# Patient Record
Sex: FEMALE | Race: WHITE | NOT HISPANIC OR LATINO | URBAN - METROPOLITAN AREA
[De-identification: names, ages, dates, MRNs, and addresses within clinical notes are randomized per-mention and may not be internally consistent; named-entity substitution may affect disease eponyms.]

---

## 2018-01-17 ENCOUNTER — OUTPATIENT (OUTPATIENT)
Dept: OUTPATIENT SERVICES | Facility: HOSPITAL | Age: 63
LOS: 1 days | End: 2018-01-17
Payer: COMMERCIAL

## 2018-01-17 DIAGNOSIS — K21.9 GASTRO-ESOPHAGEAL REFLUX DISEASE WITHOUT ESOPHAGITIS: ICD-10-CM

## 2018-01-17 DIAGNOSIS — R19.4 CHANGE IN BOWEL HABIT: ICD-10-CM

## 2018-01-17 DIAGNOSIS — Z01.818 ENCOUNTER FOR OTHER PREPROCEDURAL EXAMINATION: ICD-10-CM

## 2018-01-17 PROCEDURE — 93010 ELECTROCARDIOGRAM REPORT: CPT

## 2018-01-17 PROCEDURE — 93005 ELECTROCARDIOGRAM TRACING: CPT

## 2018-01-17 PROCEDURE — G0463: CPT

## 2018-01-18 ENCOUNTER — OUTPATIENT (OUTPATIENT)
Dept: OUTPATIENT SERVICES | Facility: HOSPITAL | Age: 63
LOS: 1 days | End: 2018-01-18
Payer: COMMERCIAL

## 2018-01-18 ENCOUNTER — RESULT REVIEW (OUTPATIENT)
Age: 63
End: 2018-01-18

## 2018-01-18 DIAGNOSIS — R19.4 CHANGE IN BOWEL HABIT: ICD-10-CM

## 2018-01-18 DIAGNOSIS — K21.9 GASTRO-ESOPHAGEAL REFLUX DISEASE WITHOUT ESOPHAGITIS: ICD-10-CM

## 2018-01-18 PROCEDURE — 88305 TISSUE EXAM BY PATHOLOGIST: CPT

## 2018-01-18 PROCEDURE — 88305 TISSUE EXAM BY PATHOLOGIST: CPT | Mod: 26

## 2018-01-18 PROCEDURE — 88312 SPECIAL STAINS GROUP 1: CPT | Mod: 26

## 2018-01-18 PROCEDURE — 88312 SPECIAL STAINS GROUP 1: CPT

## 2018-01-18 PROCEDURE — 43239 EGD BIOPSY SINGLE/MULTIPLE: CPT

## 2018-01-19 LAB — SURGICAL PATHOLOGY STUDY: SIGNIFICANT CHANGE UP

## 2018-02-09 PROBLEM — I34.0 NONRHEUMATIC MITRAL (VALVE) INSUFFICIENCY: Chronic | Status: ACTIVE | Noted: 2018-01-17

## 2018-02-09 PROBLEM — I27.20 PULMONARY HYPERTENSION, UNSPECIFIED: Chronic | Status: ACTIVE | Noted: 2018-01-17

## 2018-02-09 PROBLEM — K21.9 GASTRO-ESOPHAGEAL REFLUX DISEASE WITHOUT ESOPHAGITIS: Chronic | Status: ACTIVE | Noted: 2018-01-17

## 2018-02-09 PROBLEM — R01.1 CARDIAC MURMUR, UNSPECIFIED: Chronic | Status: ACTIVE | Noted: 2018-01-17

## 2018-02-16 ENCOUNTER — OUTPATIENT (OUTPATIENT)
Dept: OUTPATIENT SERVICES | Facility: HOSPITAL | Age: 63
LOS: 1 days | End: 2018-02-16
Payer: COMMERCIAL

## 2018-02-16 ENCOUNTER — RESULT REVIEW (OUTPATIENT)
Age: 63
End: 2018-02-16

## 2018-02-16 DIAGNOSIS — K21.9 GASTRO-ESOPHAGEAL REFLUX DISEASE WITHOUT ESOPHAGITIS: ICD-10-CM

## 2018-02-16 PROCEDURE — 88305 TISSUE EXAM BY PATHOLOGIST: CPT | Mod: 26

## 2018-02-16 PROCEDURE — 45385 COLONOSCOPY W/LESION REMOVAL: CPT

## 2018-02-16 PROCEDURE — 88305 TISSUE EXAM BY PATHOLOGIST: CPT

## 2018-02-20 LAB — SURGICAL PATHOLOGY STUDY: SIGNIFICANT CHANGE UP

## 2018-08-08 ENCOUNTER — INPATIENT (INPATIENT)
Facility: HOSPITAL | Age: 63
LOS: 0 days | Discharge: HOME | DRG: 394 | End: 2018-08-09
Attending: INTERNAL MEDICINE | Admitting: INTERNAL MEDICINE
Payer: COMMERCIAL

## 2018-08-08 VITALS
HEART RATE: 74 BPM | OXYGEN SATURATION: 97 % | SYSTOLIC BLOOD PRESSURE: 151 MMHG | TEMPERATURE: 98 F | RESPIRATION RATE: 18 BRPM | DIASTOLIC BLOOD PRESSURE: 80 MMHG

## 2018-08-08 DIAGNOSIS — T18.128A FOOD IN ESOPHAGUS CAUSING OTHER INJURY, INITIAL ENCOUNTER: ICD-10-CM

## 2018-08-08 LAB
ALBUMIN SERPL ELPH-MCNC: 4.8 G/DL — SIGNIFICANT CHANGE UP (ref 3.3–5)
ALP SERPL-CCNC: 59 U/L — SIGNIFICANT CHANGE UP (ref 40–120)
ALT FLD-CCNC: 14 U/L — SIGNIFICANT CHANGE UP (ref 10–45)
ANION GAP SERPL CALC-SCNC: 14 MMOL/L — SIGNIFICANT CHANGE UP (ref 5–17)
APTT BLD: 29.5 SEC — SIGNIFICANT CHANGE UP (ref 27.5–37.4)
AST SERPL-CCNC: 24 U/L — SIGNIFICANT CHANGE UP (ref 10–40)
BASOPHILS # BLD AUTO: 0 K/UL — SIGNIFICANT CHANGE UP (ref 0–0.2)
BASOPHILS NFR BLD AUTO: 0.8 % — SIGNIFICANT CHANGE UP (ref 0–2)
BILIRUB SERPL-MCNC: 0.3 MG/DL — SIGNIFICANT CHANGE UP (ref 0.2–1.2)
BLD GP AB SCN SERPL QL: NEGATIVE — SIGNIFICANT CHANGE UP
BUN SERPL-MCNC: 14 MG/DL — SIGNIFICANT CHANGE UP (ref 7–23)
CALCIUM SERPL-MCNC: 9.9 MG/DL — SIGNIFICANT CHANGE UP (ref 8.4–10.5)
CHLORIDE SERPL-SCNC: 101 MMOL/L — SIGNIFICANT CHANGE UP (ref 96–108)
CO2 SERPL-SCNC: 25 MMOL/L — SIGNIFICANT CHANGE UP (ref 22–31)
CREAT SERPL-MCNC: 0.83 MG/DL — SIGNIFICANT CHANGE UP (ref 0.5–1.3)
EOSINOPHIL # BLD AUTO: 0.2 K/UL — SIGNIFICANT CHANGE UP (ref 0–0.5)
EOSINOPHIL NFR BLD AUTO: 2.9 % — SIGNIFICANT CHANGE UP (ref 0–6)
GLUCOSE SERPL-MCNC: 86 MG/DL — SIGNIFICANT CHANGE UP (ref 70–99)
HCT VFR BLD CALC: 38.4 % — SIGNIFICANT CHANGE UP (ref 34.5–45)
HGB BLD-MCNC: 12.1 G/DL — SIGNIFICANT CHANGE UP (ref 11.5–15.5)
INR BLD: 1.17 RATIO — HIGH (ref 0.88–1.16)
LYMPHOCYTES # BLD AUTO: 1.5 K/UL — SIGNIFICANT CHANGE UP (ref 1–3.3)
LYMPHOCYTES # BLD AUTO: 27.8 % — SIGNIFICANT CHANGE UP (ref 13–44)
MCHC RBC-ENTMCNC: 26 PG — LOW (ref 27–34)
MCHC RBC-ENTMCNC: 31.5 GM/DL — LOW (ref 32–36)
MCV RBC AUTO: 82.4 FL — SIGNIFICANT CHANGE UP (ref 80–100)
MONOCYTES # BLD AUTO: 0.5 K/UL — SIGNIFICANT CHANGE UP (ref 0–0.9)
MONOCYTES NFR BLD AUTO: 8.9 % — SIGNIFICANT CHANGE UP (ref 2–14)
NEUTROPHILS # BLD AUTO: 3.2 K/UL — SIGNIFICANT CHANGE UP (ref 1.8–7.4)
NEUTROPHILS NFR BLD AUTO: 59.6 % — SIGNIFICANT CHANGE UP (ref 43–77)
PLATELET # BLD AUTO: 179 K/UL — SIGNIFICANT CHANGE UP (ref 150–400)
POTASSIUM SERPL-MCNC: 4.2 MMOL/L — SIGNIFICANT CHANGE UP (ref 3.5–5.3)
POTASSIUM SERPL-SCNC: 4.2 MMOL/L — SIGNIFICANT CHANGE UP (ref 3.5–5.3)
PROT SERPL-MCNC: 8.7 G/DL — HIGH (ref 6–8.3)
PROTHROM AB SERPL-ACNC: 12.8 SEC — HIGH (ref 9.8–12.7)
RBC # BLD: 4.67 M/UL — SIGNIFICANT CHANGE UP (ref 3.8–5.2)
RBC # FLD: 13.8 % — SIGNIFICANT CHANGE UP (ref 10.3–14.5)
RH IG SCN BLD-IMP: POSITIVE — SIGNIFICANT CHANGE UP
SODIUM SERPL-SCNC: 140 MMOL/L — SIGNIFICANT CHANGE UP (ref 135–145)
WBC # BLD: 5.4 K/UL — SIGNIFICANT CHANGE UP (ref 3.8–10.5)
WBC # FLD AUTO: 5.4 K/UL — SIGNIFICANT CHANGE UP (ref 3.8–10.5)

## 2018-08-08 PROCEDURE — 99223 1ST HOSP IP/OBS HIGH 75: CPT

## 2018-08-08 PROCEDURE — 71046 X-RAY EXAM CHEST 2 VIEWS: CPT | Mod: 26

## 2018-08-08 PROCEDURE — 93010 ELECTROCARDIOGRAM REPORT: CPT | Mod: NC

## 2018-08-08 PROCEDURE — 99285 EMERGENCY DEPT VISIT HI MDM: CPT | Mod: 25

## 2018-08-08 RX ORDER — SODIUM CHLORIDE 9 MG/ML
1000 INJECTION INTRAMUSCULAR; INTRAVENOUS; SUBCUTANEOUS ONCE
Qty: 0 | Refills: 0 | Status: COMPLETED | OUTPATIENT
Start: 2018-08-08 | End: 2018-08-08

## 2018-08-08 RX ORDER — MYCOPHENOLATE MOFETIL 250 MG/1
2 CAPSULE ORAL
Qty: 0 | Refills: 0 | COMMUNITY

## 2018-08-08 RX ORDER — MYCOPHENOLATE MOFETIL 250 MG/1
3 CAPSULE ORAL
Qty: 0 | Refills: 0 | COMMUNITY

## 2018-08-08 RX ORDER — HYDROMORPHONE HYDROCHLORIDE 2 MG/ML
0.5 INJECTION INTRAMUSCULAR; INTRAVENOUS; SUBCUTANEOUS
Qty: 0 | Refills: 0 | Status: DISCONTINUED | OUTPATIENT
Start: 2018-08-08 | End: 2018-08-09

## 2018-08-08 RX ORDER — SODIUM CHLORIDE 9 MG/ML
1000 INJECTION, SOLUTION INTRAVENOUS
Qty: 0 | Refills: 0 | Status: DISCONTINUED | OUTPATIENT
Start: 2018-08-08 | End: 2018-08-09

## 2018-08-08 RX ORDER — HEPARIN SODIUM 5000 [USP'U]/ML
5000 INJECTION INTRAVENOUS; SUBCUTANEOUS EVERY 12 HOURS
Qty: 0 | Refills: 0 | Status: DISCONTINUED | OUTPATIENT
Start: 2018-08-08 | End: 2018-08-09

## 2018-08-08 RX ORDER — ONDANSETRON 8 MG/1
4 TABLET, FILM COATED ORAL ONCE
Qty: 0 | Refills: 0 | Status: DISCONTINUED | OUTPATIENT
Start: 2018-08-08 | End: 2018-08-09

## 2018-08-08 RX ORDER — PANTOPRAZOLE SODIUM 20 MG/1
40 TABLET, DELAYED RELEASE ORAL EVERY 12 HOURS
Qty: 0 | Refills: 0 | Status: DISCONTINUED | OUTPATIENT
Start: 2018-08-08 | End: 2018-08-09

## 2018-08-08 RX ORDER — LEVETIRACETAM 250 MG/1
500 TABLET, FILM COATED ORAL EVERY 12 HOURS
Qty: 0 | Refills: 0 | Status: DISCONTINUED | OUTPATIENT
Start: 2018-08-08 | End: 2018-08-09

## 2018-08-08 RX ORDER — PANTOPRAZOLE SODIUM 20 MG/1
40 TABLET, DELAYED RELEASE ORAL EVERY 12 HOURS
Qty: 0 | Refills: 0 | Status: DISCONTINUED | OUTPATIENT
Start: 2018-08-08 | End: 2018-08-08

## 2018-08-08 RX ADMIN — SODIUM CHLORIDE 1000 MILLILITER(S): 9 INJECTION INTRAMUSCULAR; INTRAVENOUS; SUBCUTANEOUS at 19:50

## 2018-08-08 NOTE — H&P ADULT - NSHPPHYSICALEXAM_GEN_ALL_CORE
Vital Signs Last 24 Hrs  T(C): 36.2 (08 Aug 2018 22:35), Max: 36.4 (08 Aug 2018 18:46)  T(F): 97.2 (08 Aug 2018 22:35), Max: 97.6 (08 Aug 2018 18:46)  HR: 79 (08 Aug 2018 23:30) (71 - 79)  BP: 166/72 (08 Aug 2018 23:30) (145/66 - 172/79)  BP(mean): 108 (08 Aug 2018 23:30) (95 - 108)  RR: 16 (08 Aug 2018 23:30) (16 - 18)  SpO2: 99% (08 Aug 2018 23:30) (97% - 100%)    GENERAL: No acute distress, well-developed  HEAD:  Atraumatic, Normocephalic  EYES: EOMI, PERRLA, conjunctiva and sclera clear  ENT: Oral mucosa moist  NECK: Neck supple  CHEST/LUNG: minimal coarse crackles diffusely, No wheeze, no rales, no rhonchi.    HEART: Regular rate and rhythm; No murmurs, rubs, or gallops  ABDOMEN: Soft, Nontender, Nondistended; Bowel sounds present  EXTREMITIES:  No clubbing, cyanosis, or edema  VASCULAR: Posterior tibialis pulses intact bilaterally  PSYCH: Normal behavior, normal affect  NEUROLOGY: AAOx3  SKIN: grossly warm and dry

## 2018-08-08 NOTE — H&P ADULT - PROBLEM SELECTOR PLAN 4
Pt noted with atherosclerosis on prior CT from 3/12/2018 based on report  Is on baby ASA but not on any statins, did have TONIA showing good LV systolic function w/EF 65%.  Pt reports good f/u with cardiologist who is aware of report? Patient might benefit from statin therapy vs other further cardiac workup.  d/w patient to have close cardiologist f/u after discharge from hospital  Once able to resume po should be placed back on metoprolol, baby asa.

## 2018-08-08 NOTE — ED PROVIDER NOTE - ATTENDING CONTRIBUTION TO CARE
61 yo female with solid food bolus impaction x ~24h, unable to tolerate PO although handling secretions.  D/W GI -- plan to take patient for endoscopy tonight as an add-on case.  Will send pre-op labs, admit to medicine.

## 2018-08-08 NOTE — H&P ADULT - PROBLEM SELECTOR PLAN 2
Pt w/seizure d/o but has not been able to tolerate food well due to impaction. Pt unsure when she was last able to successfully take lamictal though likely was yesterday AM.   Check lamictal level  Start on IV keppra for now until able to resume lamictal

## 2018-08-08 NOTE — ED ADULT TRIAGE NOTE - CHIEF COMPLAINT QUOTE
sent here for endoscopy; reports ate part of a hot dog, "might be stuck", called MD told to come to the ED for endoscopy; no difficulty breathing

## 2018-08-08 NOTE — ED PROVIDER NOTE - PMH
Arrhythmia    Arthritis    Epilepsy    GERD (gastroesophageal reflux disease)    Heart murmur    Mitral regurgitation  mild  Pulmonary hypertension    Scleroderma

## 2018-08-08 NOTE — ED PROVIDER NOTE - INTERPRETATION
EKG reviewed for rate, rhythm, axis, intervals and segments, including QRS morphology, P wave appearance T wave appearance, CA interval, and QT interval.  I find the EKG to be unremarkable in all of these regards except as follows: RBBB, LAFB, LVH,

## 2018-08-08 NOTE — ED PROVIDER NOTE - OBJECTIVE STATEMENT
61 y/o female hx of scleroderms who presents to the ED for possible food impaction. reports unable to eat or drink anything in the last 24 hours without regurgitating it. reports she feels like something is stuck in her central chest. otherwise feeling well.

## 2018-08-08 NOTE — H&P ADULT - PROBLEM SELECTOR PLAN 1
s/p extraction, however with significant ulceration/maceration at site of impaction and food was not able to be passed distally during endoscopy.  Will need esophagram prior to po intake  NPO at this time  F/U Esophagram ordered by Dr Lucio  If no issues on esophagram plan for advancing to clears and resuming po meds

## 2018-08-08 NOTE — H&P ADULT - ASSESSMENT
61 y/o F w/pmhx of seizure/epilepsy, scleroderma, pulmonary HTN on bosentan, arrhythmia (pt unsure what type), GERD, thyroid nodules, hx food impaction in the past now presenting with inability to eat solid foods and vomiting up of eaten food x 1 day now s/p extraction of impacted food bolus (hot dog)

## 2018-08-08 NOTE — H&P ADULT - HISTORY OF PRESENT ILLNESS
61 y/o F w/pmhx of seizure/epilepsy, scleroderma, pulmonary HTN on bosentan, arrhythmia (pt unsure what type), GERD, thyroid nodules, hx food impaction in the past now presenting with inability to eat solid foods and vomiting up of eaten food x 1 day. Patient reports that yesterday evening she was eating a hot dog for dinner when she felt as if part of the hot dog had gotten stuck in her chest. Patient had not thought too much of it and had gone to bed. Today she had attempted to eat lunch and was initially able to get down small amounts of food and small amounts of liquid. However, later in the afternoon and evening she began to have vomiting up of liquids, and further attempts to eat dinner were not successful with vomiting up of undigested food. Because of this her family was concerned and patient called her gastroenterologist Dr. Lucio and was told to go to the ED for treatment. Patient denies any fevers, chest pain, shortness of breath, diarrhea, or constipation. Does report that a few years ago she had weight loss of 15 pounds and frequent diarrhea but that the diarrhea had resolved though she has not been able to regain significant weight, patient does report multiple checkups of her lungs and heart and thyroid including CT chest in march as well as TTE in march.

## 2018-08-08 NOTE — PROGRESS NOTE ADULT - SUBJECTIVE AND OBJECTIVE BOX
GI Procedure Note (see admission H&P in physical chart):    After explanation of indication, procedure, risks (including bleeding and perforation), benefits, and alternatives, the patient gave informed consent for an EGD in the OR to assess for a food impaction.    The procedure was performed with the patient intubated under general anesthesia for airway protection.    Findings = see report for details    Imp: S/P removal of esophageal food impaction    Plan: PACU then admit to medical [facility]          NPO overnight          Esophagram in AM--if perforation ruled out, then start clear liquid diet and oral medications          Pantoprazole 40 mg IV q 12h      Discussed with hospitalist, Dr. Perkins; patient, and her sister-in-law

## 2018-08-08 NOTE — H&P ADULT - NSHPLABSRESULTS_GEN_ALL_CORE
Labs personally reviewed:                        12.1   5.4   )-----------( 179      ( 08 Aug 2018 20:12 )             38.4       08-08    140  |  101  |  14  ----------------------------<  86  4.2   |  25  |  0.83    Ca    9.9      08 Aug 2018 20:12    TPro  8.7<H>  /  Alb  4.8  /  TBili  0.3  /  DBili  x   /  AST  24  /  ALT  14  /  AlkPhos  59  08-08            LIVER FUNCTIONS - ( 08 Aug 2018 20:12 )  Alb: 4.8 g/dL / Pro: 8.7 g/dL / ALK PHOS: 59 U/L / ALT: 14 U/L / AST: 24 U/L / GGT: x             PT/INR - ( 08 Aug 2018 20:12 )   PT: 12.8 sec;   INR: 1.17 ratio         PTT - ( 08 Aug 2018 20:12 )  PTT:29.5 sec    CXR personally reviewed-clear lungs    CT from 3/12/2018 report reviewed from [facility]-aveolitis w/minimal fibrosis c/w scleroderma. Signs of coronary atherosclerosis.  TTE from 3/12/2018 report obtained and reviewed-EF 65%, mild AR, minimal AS/TR/AL.    Endoscopy report from today reviewed: Hot dog impacted in esophagus now removed. Case discussed with gastroenterologist Dr Lucio at bedside.

## 2018-08-08 NOTE — ED ADULT NURSE NOTE - NSIMPLEMENTINTERV_GEN_ALL_ED
Implemented All Universal Safety Interventions:  [place] to call system. Call bell, personal items and telephone within reach. Instruct patient to call for assistance. Room bathroom lighting operational. Non-slip footwear when patient is off stretcher. Physically safe environment: no spills, clutter or unnecessary equipment. Stretcher in lowest position, wheels locked, appropriate side rails in place.

## 2018-08-08 NOTE — ED ADULT NURSE NOTE - OBJECTIVE STATEMENT
61 y/o female hx of scleroderma who presents to the ED for possible food impaction. reports unable to eat or drink anything in the last 24 hours without regurgitating it. reports she feels like something is stuck in her central chest. otherwise feeling well.

## 2018-08-09 VITALS
SYSTOLIC BLOOD PRESSURE: 138 MMHG | RESPIRATION RATE: 14 BRPM | OXYGEN SATURATION: 100 % | HEART RATE: 68 BPM | DIASTOLIC BLOOD PRESSURE: 72 MMHG | TEMPERATURE: 98 F

## 2018-08-09 DIAGNOSIS — T18.128A FOOD IN ESOPHAGUS CAUSING OTHER INJURY, INITIAL ENCOUNTER: ICD-10-CM

## 2018-08-09 DIAGNOSIS — I27.20 PULMONARY HYPERTENSION, UNSPECIFIED: ICD-10-CM

## 2018-08-09 DIAGNOSIS — M34.9 SYSTEMIC SCLEROSIS, UNSPECIFIED: ICD-10-CM

## 2018-08-09 DIAGNOSIS — G40.909 EPILEPSY, UNSPECIFIED, NOT INTRACTABLE, WITHOUT STATUS EPILEPTICUS: ICD-10-CM

## 2018-08-09 DIAGNOSIS — I25.10 ATHEROSCLEROTIC HEART DISEASE OF NATIVE CORONARY ARTERY WITHOUT ANGINA PECTORIS: ICD-10-CM

## 2018-08-09 DIAGNOSIS — K21.9 GASTRO-ESOPHAGEAL REFLUX DISEASE WITHOUT ESOPHAGITIS: ICD-10-CM

## 2018-08-09 LAB
ANION GAP SERPL CALC-SCNC: 13 MMOL/L — SIGNIFICANT CHANGE UP (ref 5–17)
BUN SERPL-MCNC: 11 MG/DL — SIGNIFICANT CHANGE UP (ref 7–23)
CALCIUM SERPL-MCNC: 9.1 MG/DL — SIGNIFICANT CHANGE UP (ref 8.4–10.5)
CHLORIDE SERPL-SCNC: 97 MMOL/L — SIGNIFICANT CHANGE UP (ref 96–108)
CO2 SERPL-SCNC: 23 MMOL/L — SIGNIFICANT CHANGE UP (ref 22–31)
CREAT SERPL-MCNC: 0.72 MG/DL — SIGNIFICANT CHANGE UP (ref 0.5–1.3)
GLUCOSE SERPL-MCNC: 147 MG/DL — HIGH (ref 70–99)
HCT VFR BLD CALC: 31.4 % — LOW (ref 34.5–45)
HGB BLD-MCNC: 11.1 G/DL — LOW (ref 11.5–15.5)
MCHC RBC-ENTMCNC: 29 PG — SIGNIFICANT CHANGE UP (ref 27–34)
MCHC RBC-ENTMCNC: 35.4 GM/DL — SIGNIFICANT CHANGE UP (ref 32–36)
MCV RBC AUTO: 82 FL — SIGNIFICANT CHANGE UP (ref 80–100)
PLATELET # BLD AUTO: 145 K/UL — LOW (ref 150–400)
POTASSIUM SERPL-MCNC: 3.9 MMOL/L — SIGNIFICANT CHANGE UP (ref 3.5–5.3)
POTASSIUM SERPL-SCNC: 3.9 MMOL/L — SIGNIFICANT CHANGE UP (ref 3.5–5.3)
RBC # BLD: 3.82 M/UL — SIGNIFICANT CHANGE UP (ref 3.8–5.2)
RBC # FLD: 13.5 % — SIGNIFICANT CHANGE UP (ref 10.3–14.5)
SODIUM SERPL-SCNC: 133 MMOL/L — LOW (ref 135–145)
T4 FREE SERPL-MCNC: 1.4 NG/DL — SIGNIFICANT CHANGE UP (ref 0.9–1.8)
TSH SERPL-MCNC: 0.46 UIU/ML — SIGNIFICANT CHANGE UP (ref 0.27–4.2)
WBC # BLD: 6 K/UL — SIGNIFICANT CHANGE UP (ref 3.8–10.5)
WBC # FLD AUTO: 6 K/UL — SIGNIFICANT CHANGE UP (ref 3.8–10.5)

## 2018-08-09 PROCEDURE — 93005 ELECTROCARDIOGRAM TRACING: CPT

## 2018-08-09 PROCEDURE — 80048 BASIC METABOLIC PNL TOTAL CA: CPT

## 2018-08-09 PROCEDURE — 99285 EMERGENCY DEPT VISIT HI MDM: CPT

## 2018-08-09 PROCEDURE — 85610 PROTHROMBIN TIME: CPT

## 2018-08-09 PROCEDURE — 74220 X-RAY XM ESOPHAGUS 1CNTRST: CPT

## 2018-08-09 PROCEDURE — 86900 BLOOD TYPING SEROLOGIC ABO: CPT

## 2018-08-09 PROCEDURE — 84443 ASSAY THYROID STIM HORMONE: CPT

## 2018-08-09 PROCEDURE — 85027 COMPLETE CBC AUTOMATED: CPT

## 2018-08-09 PROCEDURE — 71046 X-RAY EXAM CHEST 2 VIEWS: CPT

## 2018-08-09 PROCEDURE — 86850 RBC ANTIBODY SCREEN: CPT

## 2018-08-09 PROCEDURE — 86901 BLOOD TYPING SEROLOGIC RH(D): CPT

## 2018-08-09 PROCEDURE — 74220 X-RAY XM ESOPHAGUS 1CNTRST: CPT | Mod: 26

## 2018-08-09 PROCEDURE — 84439 ASSAY OF FREE THYROXINE: CPT

## 2018-08-09 PROCEDURE — 85730 THROMBOPLASTIN TIME PARTIAL: CPT

## 2018-08-09 PROCEDURE — 80053 COMPREHEN METABOLIC PANEL: CPT

## 2018-08-09 RX ORDER — MYCOPHENOLATE MOFETIL 250 MG/1
1000 CAPSULE ORAL AT BEDTIME
Qty: 0 | Refills: 0 | Status: DISCONTINUED | OUTPATIENT
Start: 2018-08-09 | End: 2018-08-09

## 2018-08-09 RX ORDER — CHOLECALCIFEROL (VITAMIN D3) 125 MCG
400 CAPSULE ORAL DAILY
Qty: 0 | Refills: 0 | Status: DISCONTINUED | OUTPATIENT
Start: 2018-08-09 | End: 2018-08-09

## 2018-08-09 RX ORDER — ASPIRIN/CALCIUM CARB/MAGNESIUM 324 MG
325 TABLET ORAL DAILY
Qty: 0 | Refills: 0 | Status: DISCONTINUED | OUTPATIENT
Start: 2018-08-09 | End: 2018-08-09

## 2018-08-09 RX ORDER — DOXAZOSIN MESYLATE 4 MG
2 TABLET ORAL AT BEDTIME
Qty: 0 | Refills: 0 | Status: DISCONTINUED | OUTPATIENT
Start: 2018-08-09 | End: 2018-08-09

## 2018-08-09 RX ORDER — MYCOPHENOLATE MOFETIL 250 MG/1
1500 CAPSULE ORAL DAILY
Qty: 0 | Refills: 0 | Status: DISCONTINUED | OUTPATIENT
Start: 2018-08-10 | End: 2018-08-09

## 2018-08-09 RX ORDER — ACETAMINOPHEN 500 MG
650 TABLET ORAL EVERY 6 HOURS
Qty: 0 | Refills: 0 | Status: DISCONTINUED | OUTPATIENT
Start: 2018-08-09 | End: 2018-08-09

## 2018-08-09 RX ORDER — FERROUS SULFATE 325(65) MG
325 TABLET ORAL
Qty: 0 | Refills: 0 | Status: DISCONTINUED | OUTPATIENT
Start: 2018-08-09 | End: 2018-08-09

## 2018-08-09 RX ORDER — METOPROLOL TARTRATE 50 MG
1 TABLET ORAL
Qty: 0 | Refills: 0 | COMMUNITY

## 2018-08-09 RX ORDER — MYCOPHENOLATE MOFETIL 250 MG/1
1500 CAPSULE ORAL ONCE
Qty: 0 | Refills: 0 | Status: COMPLETED | OUTPATIENT
Start: 2018-08-09 | End: 2018-08-09

## 2018-08-09 RX ORDER — LAMOTRIGINE 25 MG/1
200 TABLET, ORALLY DISINTEGRATING ORAL DAILY
Qty: 0 | Refills: 0 | Status: DISCONTINUED | OUTPATIENT
Start: 2018-08-09 | End: 2018-08-09

## 2018-08-09 RX ORDER — BOSENTAN 125 MG/1
125 TABLET, FILM COATED ORAL
Qty: 0 | Refills: 0 | Status: DISCONTINUED | OUTPATIENT
Start: 2018-08-09 | End: 2018-08-09

## 2018-08-09 RX ORDER — NIFEDIPINE 30 MG
60 TABLET, EXTENDED RELEASE 24 HR ORAL DAILY
Qty: 0 | Refills: 0 | Status: DISCONTINUED | OUTPATIENT
Start: 2018-08-09 | End: 2018-08-09

## 2018-08-09 RX ORDER — METOPROLOL TARTRATE 50 MG
50 TABLET ORAL
Qty: 0 | Refills: 0 | Status: DISCONTINUED | OUTPATIENT
Start: 2018-08-09 | End: 2018-08-09

## 2018-08-09 RX ADMIN — Medication 60 MILLIGRAM(S): at 15:32

## 2018-08-09 RX ADMIN — LEVETIRACETAM 400 MILLIGRAM(S): 250 TABLET, FILM COATED ORAL at 01:00

## 2018-08-09 RX ADMIN — MYCOPHENOLATE MOFETIL 1500 MILLIGRAM(S): 250 CAPSULE ORAL at 15:31

## 2018-08-09 RX ADMIN — LEVETIRACETAM 400 MILLIGRAM(S): 250 TABLET, FILM COATED ORAL at 12:23

## 2018-08-09 RX ADMIN — PANTOPRAZOLE SODIUM 40 MILLIGRAM(S): 20 TABLET, DELAYED RELEASE ORAL at 00:45

## 2018-08-09 RX ADMIN — SODIUM CHLORIDE 80 MILLILITER(S): 9 INJECTION, SOLUTION INTRAVENOUS at 00:45

## 2018-08-09 RX ADMIN — PANTOPRAZOLE SODIUM 40 MILLIGRAM(S): 20 TABLET, DELAYED RELEASE ORAL at 12:23

## 2018-08-09 NOTE — DISCHARGE NOTE ADULT - PLAN OF CARE
Follow the recommended diet:  Mechanical soft diet -     This diet is designed for people who have trouble chewing and swallowing. Chopped, ground and pureed foods are included in this diet, as well as foods that readily break apart without a knife.  This diet does include soft breads and rice, so it is important that you are able to move food in your mouth and are able to swallow safely.    Use tools such as a , , food chopper, , or potato masher to prepare foods for this diet.  ? Use gravies, sauces, vegetable or fruit juice, milk, half-and-half or water from cooking to moisten foods when blending, chopping, grinding, or mashing them.  ? Serve foods with gravies or sauces to moisten them and to add flavor.  ? Serve soft, moist casseroles and fish, meat, or egg salads without large chunks of meat or vegetables.  ? Serve soups with small pieces of easy to chew and easy to swallow meats and vegetables.  ? Cover bread products such as pancakes or bread slices with sauce, gravy, or syrup, then mix the two items until the bread product begins to dissolve into a slurry.  4  ? Add dry milk powder to foods to increase the calories and protein in this diet.  ? Prepare quantities of favorite food items and freeze them in portion sizes for use later.  ? Reheat foods carefully so that a tough outer crust does not form on them.  ? Avoid sticky foods such as peanut butter, and chewy candies such as caramel, toffee, and licorice.  ? Vegetables should be cooked tender enough to be easily mashed with a fork. Change the factors that you can control.  Avoid foods and drinks that make your symptoms worse, such as:   -  Caffeine or alcoholic drinks, chocolate, peppermint or mint flavorings, garlic and onions, spicy foods, citrus fruits (such as oranges, ham, or limes), Tomato-based foods (such as sauce, chili, salsa, and pizza), fried and fatty foods.    -  Avoid lying down for the 3 hours prior to your bedtime or prior to taking a nap.  -  Eat small, frequent meals instead of large meals.   -  Wear loose-fitting clothing. Do not wear anything tight around your waist that causes pressure on your stomach.  -  Raise the head of your bed 6 to 8 inches with wood blocks to help you sleep. Extra pillows will not help.  -  Only take over-the-counter or prescription medicines for pain, discomfort, or fever as directed by your caregiver.     SEEK IMMEDIATE MEDICAL CARE IF:  -  You have pain in your arms, neck, jaw, teeth, or back.   -  Your pain increases or changes in intensity or duration.   -  You develop nausea, vomiting, or sweating (diaphoresis).  -  You develop shortness of breath, or you faint.  -  Your vomit is green, yellow, black, or looks like coffee grounds or blood.  -  Your stool is red, bloody, or black. Continue to follow a low salt/sodium diet.  Perform physical activities as tolerated in consultation with your Primary Care Provider and physical therapist.  Take all medications as prescribed.  Follow up with your medical doctor for routine blood pressure monitoring at your next visit.  Notify your doctor if you have any of the following symptoms:  Dizziness, lightheadedness, blurry vision, headache, chest pain, or shortness of breath. Take all medications as prescribed.  Follow up with your Rheumatologist within 1-2 weeks. Take all medications as prescribed.  Follow up with your Primary Care doctor within 1 week.      Seek immediate medical attention if you have a seizure. Call your doctor if you have any new pain, pressure, or discomfort in the center of your chest, pain, tingling or discomfort in arms, back, neck, jaw, or stomach, shortness of breath, nausea, vomiting, burping or heartburn, sweating, cold and clammy skin, racing or abnormal heartbeat for more than 10 minutes or if they keep coming & going.  Call 911 and do not try to get to hospital by car.  You can help yourself with lifestyle changes (quitting smoking if you smoke), eat lots of fruits & vegetables & low fat dairy products, not a lot of meat & fatty foods, walk or some form of physical activity most days of the week, lose weight if you are overweight.  Take your cardiac medication as prescribed to lower cholesterol, to lower blood pressure, and control your blood sugar. Retrieved - patient will be able to tolerate recommended diet Follow the recommended diet:  Mechanical soft diet.     This diet is designed for people who have trouble chewing and swallowing. Chopped, ground and pureed foods are included in this diet, as well as foods that readily break apart without a knife.  This diet does include soft breads and rice, so it is important that you are able to move food in your mouth and are able to swallow safely.    Use tools such as a , , food chopper, , or potato masher to prepare foods for this diet.  ? Use gravies, sauces, vegetable or fruit juice, milk, half-and-half or water from cooking to moisten foods when blending, chopping, grinding, or mashing them.  ? Serve foods with gravies or sauces to moisten them and to add flavor.  ? Serve soft, moist casseroles and fish, meat, or egg salads without large chunks of meat or vegetables.  ? Serve soups with small pieces of easy to chew and easy to swallow meats and vegetables.  ? Cover bread products such as pancakes or bread slices with sauce, gravy, or syrup, then mix the two items until the bread product begins to dissolve into a slurry.  4  ? Add dry milk powder to foods to increase the calories and protein in this diet.  ? Prepare quantities of favorite food items and freeze them in portion sizes for use later.  ? Reheat foods carefully so that a tough outer crust does not form on them.  ? Avoid sticky foods such as peanut butter, and chewy candies such as caramel, toffee, and licorice.  ? Vegetables should be cooked tender enough to be easily mashed with a fork.

## 2018-08-09 NOTE — PROGRESS NOTE ADULT - SUBJECTIVE AND OBJECTIVE BOX
SUBJECTIVE:  Resting comfortably, sitting in chair, in PACU overnight. Has some soreness in the front of her throat, but no chest pain, SOB, or fever.   _____________________________________________________  OBJECTIVE:    T(C): 36.2 (08-08-18 @ 22:35), Max: 36.4 (08-08-18 @ 18:46)  HR: 68 (08-09-18 @ 04:00)  BP: 132/61 (08-09-18 @ 04:00)  RR: 15 (08-09-18 @ 04:00)  SpO2: 99% (08-09-18 @ 04:00)  Wt(kg): --    General = Comfortable-appearing, no acute distress  Abdomen = Non-distended, normal active bowel sounds, soft, nontender, no palpable mass or organomegaly, no bruit  _____________________________________________________  LABS:                        11.1   6.0   )-----------( 145      ( 09 Aug 2018 02:38 )             31.4     08-09    133<L>  |  97  |  11  ----------------------------<  147<H>  3.9   |  23  |  0.72    Ca    9.1      09 Aug 2018 02:38    TPro  8.7<H>  /  Alb  4.8  /  TBili  0.3  /  DBili  x   /  AST  24  /  ALT  14  /  AlkPhos  59  08-08    LIVER FUNCTIONS - ( 08 Aug 2018 20:12 )  Alb: 4.8 g/dL / Pro: 8.7 g/dL / ALK PHOS: 59 U/L / ALT: 14 U/L / AST: 24 U/L / GGT: x             _____________________________________________________  ACTIVE MEDS:  MEDICATIONS  (STANDING):  dextrose 5% + sodium chloride 0.45%. 1000 milliLiter(s) (80 mL/Hr) IV Continuous <Continuous>  heparin  Injectable 5000 Unit(s) SubCutaneous every 12 hours  levETIRAcetam  IVPB 500 milliGRAM(s) IV Intermittent every 12 hours  pantoprazole  Injectable 40 milliGRAM(s) IV Push every 12 hours    MEDICATIONS  (PRN):  HYDROmorphone  Injectable 0.5 milliGRAM(s) IV Push every 10 minutes PRN Moderate Pain (4 - 6)  ondansetron Injectable 4 milliGRAM(s) IV Push once PRN Nausea and/or Vomiting    _____________________________________________________  ASSESSMENT:  62yFemale stable s/p endoscopic removal of esophageal food impaction last night, due to scleroderma esophagus and distal erosive esophagitis.    H/H drop due to hydration; now back at her baseline level.    PLAN:  Esophagram today--if esophagus is found to be intact, start clear liquid diet  If clears tolerated, may advance to mechanical soft foods--if tolerated, can potentially discharge patient home later today on soft diet and pantoprazole 40 mg BID  RTO 2 weeks    Remi Lucio M.D.  (O) [phone]  (C) [phone]

## 2018-08-09 NOTE — DISCHARGE NOTE ADULT - CARE PROVIDER_API CALL
Remi Lucio), Gastroenterology; Internal Medicine  [address] [facility] [address]  [place] [address]  Phone: [phone]  Fax: [phone]    Goldberg, Avram Z (MD), Internal Medicine; Rheumatology  [address] [address]  [place] [address]  Phone: [phone]  Fax: [phone]    Dr. Nikki Agarwal, Pulmonologist  [address] [address]  Phone: [phone]  Fax: (       -

## 2018-08-09 NOTE — DISCHARGE NOTE ADULT - SECONDARY DIAGNOSIS.
GERD (gastroesophageal reflux disease) Pulmonary hypertension Scleroderma Nonintractable epilepsy without status epilepticus, unspecified epilepsy type Atherosclerosis of native coronary artery of native heart without angina pectoris

## 2018-08-09 NOTE — PROGRESS NOTE ADULT - ASSESSMENT
63 y/o F w/pmhx of seizure/epilepsy, scleroderma, pulmonary HTN on bosentan, arrhythmia (pt unsure what type), GERD, thyroid nodules, hx food impaction in the past now presenting with inability to eat solid foods and vomiting up of eaten food x 1 day now s/p extraction of impacted food bolus (hot dog)

## 2018-08-09 NOTE — DISCHARGE NOTE ADULT - CARE PROVIDERS DIRECT ADDRESSES
,[email or web address],DirectAddress_Unknown,DirectAddress_Unknown

## 2018-08-09 NOTE — DISCHARGE NOTE ADULT - PROVIDER TOKENS
TOKEN:'[id number]:'[id number]',FREE:[LAST:[Dr. Nikki Agarwal],FIRST:[Pulmonologist],PHONE:[phone]:[(   [phone]    -],ADDRESS:[[address] [place] [address]]]

## 2018-08-09 NOTE — DISCHARGE NOTE ADULT - ADDITIONAL INSTRUCTIONS
Follow the recommended diet until your follow up with the Gastroenterologist within 2 weeks.  Follow up with your Rheumatologist within 1-2 weeks.  Follow up with your Primary Care Doctor within 1 week.

## 2018-08-09 NOTE — DISCHARGE NOTE ADULT - MEDICATION SUMMARY - MEDICATIONS TO TAKE
I will START or STAY ON the medications listed below when I get home from the hospital:    bosentan 125 mg oral tablet  -- 1 tab(s) by mouth 2 times a day  -- Indication: For Pulmonary hypertension    aspirin 325 mg oral tablet  -- 1 tab(s) by mouth once a day  -- Indication: For Atherosclerosis of native coronary artery of native heart without angina pectoris    naproxen 500 mg oral tablet  -- 2 tab(s) by mouth once a day, As Needed  -- Indication: For Arthritis    Tylenol 500 mg oral tablet  -- 2 tab(s) by mouth every 6 hours, As Needed  -- Indication: For Pain    prazosin 1 mg oral capsule  -- 2 cap(s) by mouth once a day in the evening  -- Indication: For Hypertension    LaMICtal  mg oral tablet, extended release  -- 1 tab(s) by mouth once a day  -- Indication: For Epilepsy    Metoprolol Tartrate 50 mg oral tablet  -- 1 tab(s) by mouth 2 times a day  -- Indication: For Hypertension    NIFEdipine 60 mg oral tablet, extended release  -- 1 tab(s) by mouth once a day  -- Indication: For Hypertension    CellCept 500 mg oral tablet  -- 3 tab(s) by mouth once a day in the morning  -- Indication: For Scleroderma    CellCept 500 mg oral tablet  -- 2 tab(s) by mouth once a day (at bedtime)  -- Indication: For Scleroderma    ferrous gluconate  -- 1 tab(s) by mouth 2 times a day  -- Indication: For Supplement    NexIUM 40 mg oral delayed release capsule  -- 1 cap(s) by mouth 2 times a day  -- Indication: For GERD (gastroesophageal reflux disease) - NOTE THAT THIS HAS BEEN CHANGED TO TWICE A DAY    Vitamin D3  -- 1 tab(s) by mouth once a day  -- Indication: For Supplement

## 2018-08-09 NOTE — DISCHARGE NOTE ADULT - HOSPITAL COURSE
61 y/o female with PMH of seizure/epilepsy, scleroderma, pulmonary HTN on bosentan, arrhythmia (pt unsure what type), GERD, thyroid nodules, history food impaction in the past now presenting with inability to eat solid foods and vomiting up of eaten food x 1 day now s/p extraction of impacted food bolus (hot dog), however with significant ulceration/maceration at site of impaction and food was not able to be passed distally during endoscopy.  Xray esophogram done post-procedure shows no evidence for perforation and normal esophageal contour and motility, but noted significant delay of the contrast through the gastroesophageal junction.  Findings likely associated with scleroderma.  Home medications, which were initially held 2/2 impaction have now been resumed.  Patient tolerated clear liquid diet, now transitioned to mechanical soft…...…… diet on which she will remain until her follow up appointment with Gastroenterology in 2 weeks.

## 2018-08-09 NOTE — DISCHARGE NOTE ADULT - CARE PLAN
Principal Discharge DX:	Food impaction of esophagus, initial encounter  Assessment and plan of treatment:	Follow the recommended diet:  Mechanical soft diet -     This diet is designed for people who have trouble chewing and swallowing. Chopped, ground and pureed foods are included in this diet, as well as foods that readily break apart without a knife.  This diet does include soft breads and rice, so it is important that you are able to move food in your mouth and are able to swallow safely.    Use tools such as a , , food chopper, , or potato masher to prepare foods for this diet.  ? Use gravies, sauces, vegetable or fruit juice, milk, half-and-half or water from cooking to moisten foods when blending, chopping, grinding, or mashing them.  ? Serve foods with gravies or sauces to moisten them and to add flavor.  ? Serve soft, moist casseroles and fish, meat, or egg salads without large chunks of meat or vegetables.  ? Serve soups with small pieces of easy to chew and easy to swallow meats and vegetables.  ? Cover bread products such as pancakes or bread slices with sauce, gravy, or syrup, then mix the two items until the bread product begins to dissolve into a slurry.  4  ? Add dry milk powder to foods to increase the calories and protein in this diet.  ? Prepare quantities of favorite food items and freeze them in portion sizes for use later.  ? Reheat foods carefully so that a tough outer crust does not form on them.  ? Avoid sticky foods such as peanut butter, and chewy candies such as caramel, toffee, and licorice.  ? Vegetables should be cooked tender enough to be easily mashed with a fork.  Secondary Diagnosis:	GERD (gastroesophageal reflux disease)  Assessment and plan of treatment:	Change the factors that you can control.  Avoid foods and drinks that make your symptoms worse, such as:   -  Caffeine or alcoholic drinks, chocolate, peppermint or mint flavorings, garlic and onions, spicy foods, citrus fruits (such as oranges, ham, or limes), Tomato-based foods (such as sauce, chili, salsa, and pizza), fried and fatty foods.    -  Avoid lying down for the 3 hours prior to your bedtime or prior to taking a nap.  -  Eat small, frequent meals instead of large meals.   -  Wear loose-fitting clothing. Do not wear anything tight around your waist that causes pressure on your stomach.  -  Raise the head of your bed 6 to 8 inches with wood blocks to help you sleep. Extra pillows will not help.  -  Only take over-the-counter or prescription medicines for pain, discomfort, or fever as directed by your caregiver.     SEEK IMMEDIATE MEDICAL CARE IF:  -  You have pain in your arms, neck, jaw, teeth, or back.   -  Your pain increases or changes in intensity or duration.   -  You develop nausea, vomiting, or sweating (diaphoresis).  -  You develop shortness of breath, or you faint.  -  Your vomit is green, yellow, black, or looks like coffee grounds or blood.  -  Your stool is red, bloody, or black.  Secondary Diagnosis:	Pulmonary hypertension  Assessment and plan of treatment:	Continue to follow a low salt/sodium diet.  Perform physical activities as tolerated in consultation with your Primary Care Provider and physical therapist.  Take all medications as prescribed.  Follow up with your medical doctor for routine blood pressure monitoring at your next visit.  Notify your doctor if you have any of the following symptoms:  Dizziness, lightheadedness, blurry vision, headache, chest pain, or shortness of breath.  Secondary Diagnosis:	Scleroderma  Assessment and plan of treatment:	Take all medications as prescribed.  Follow up with your Rheumatologist within 1-2 weeks.  Secondary Diagnosis:	Nonintractable epilepsy without status epilepticus, unspecified epilepsy type  Assessment and plan of treatment:	Take all medications as prescribed.  Follow up with your Primary Care doctor within 1 week.      Seek immediate medical attention if you have a seizure.  Secondary Diagnosis:	Atherosclerosis of native coronary artery of native heart without angina pectoris  Assessment and plan of treatment:	Call your doctor if you have any new pain, pressure, or discomfort in the center of your chest, pain, tingling or discomfort in arms, back, neck, jaw, or stomach, shortness of breath, nausea, vomiting, burping or heartburn, sweating, cold and clammy skin, racing or abnormal heartbeat for more than 10 minutes or if they keep coming & going.  Call 911 and do not try to get to hospital by car.  You can help yourself with lifestyle changes (quitting smoking if you smoke), eat lots of fruits & vegetables & low fat dairy products, not a lot of meat & fatty foods, walk or some form of physical activity most days of the week, lose weight if you are overweight.  Take your cardiac medication as prescribed to lower cholesterol, to lower blood pressure, and control your blood sugar. Principal Discharge DX:	Food impaction of esophagus, initial encounter  Goal:	Retrieved - patient will be able to tolerate recommended diet  Assessment and plan of treatment:	Follow the recommended diet:  Mechanical soft diet.     This diet is designed for people who have trouble chewing and swallowing. Chopped, ground and pureed foods are included in this diet, as well as foods that readily break apart without a knife.  This diet does include soft breads and rice, so it is important that you are able to move food in your mouth and are able to swallow safely.    Use tools such as a , , food chopper, , or potato masher to prepare foods for this diet.  ? Use gravies, sauces, vegetable or fruit juice, milk, half-and-half or water from cooking to moisten foods when blending, chopping, grinding, or mashing them.  ? Serve foods with gravies or sauces to moisten them and to add flavor.  ? Serve soft, moist casseroles and fish, meat, or egg salads without large chunks of meat or vegetables.  ? Serve soups with small pieces of easy to chew and easy to swallow meats and vegetables.  ? Cover bread products such as pancakes or bread slices with sauce, gravy, or syrup, then mix the two items until the bread product begins to dissolve into a slurry.  4  ? Add dry milk powder to foods to increase the calories and protein in this diet.  ? Prepare quantities of favorite food items and freeze them in portion sizes for use later.  ? Reheat foods carefully so that a tough outer crust does not form on them.  ? Avoid sticky foods such as peanut butter, and chewy candies such as caramel, toffee, and licorice.  ? Vegetables should be cooked tender enough to be easily mashed with a fork.  Secondary Diagnosis:	GERD (gastroesophageal reflux disease)  Assessment and plan of treatment:	Change the factors that you can control.  Avoid foods and drinks that make your symptoms worse, such as:   -  Caffeine or alcoholic drinks, chocolate, peppermint or mint flavorings, garlic and onions, spicy foods, citrus fruits (such as oranges, ham, or limes), Tomato-based foods (such as sauce, chili, salsa, and pizza), fried and fatty foods.    -  Avoid lying down for the 3 hours prior to your bedtime or prior to taking a nap.  -  Eat small, frequent meals instead of large meals.   -  Wear loose-fitting clothing. Do not wear anything tight around your waist that causes pressure on your stomach.  -  Raise the head of your bed 6 to 8 inches with wood blocks to help you sleep. Extra pillows will not help.  -  Only take over-the-counter or prescription medicines for pain, discomfort, or fever as directed by your caregiver.     SEEK IMMEDIATE MEDICAL CARE IF:  -  You have pain in your arms, neck, jaw, teeth, or back.   -  Your pain increases or changes in intensity or duration.   -  You develop nausea, vomiting, or sweating (diaphoresis).  -  You develop shortness of breath, or you faint.  -  Your vomit is green, yellow, black, or looks like coffee grounds or blood.  -  Your stool is red, bloody, or black.  Secondary Diagnosis:	Pulmonary hypertension  Assessment and plan of treatment:	Continue to follow a low salt/sodium diet.  Perform physical activities as tolerated in consultation with your Primary Care Provider and physical therapist.  Take all medications as prescribed.  Follow up with your medical doctor for routine blood pressure monitoring at your next visit.  Notify your doctor if you have any of the following symptoms:  Dizziness, lightheadedness, blurry vision, headache, chest pain, or shortness of breath.  Secondary Diagnosis:	Scleroderma  Assessment and plan of treatment:	Take all medications as prescribed.  Follow up with your Rheumatologist within 1-2 weeks.  Secondary Diagnosis:	Nonintractable epilepsy without status epilepticus, unspecified epilepsy type  Assessment and plan of treatment:	Take all medications as prescribed.  Follow up with your Primary Care doctor within 1 week.      Seek immediate medical attention if you have a seizure.  Secondary Diagnosis:	Atherosclerosis of native coronary artery of native heart without angina pectoris  Assessment and plan of treatment:	Call your doctor if you have any new pain, pressure, or discomfort in the center of your chest, pain, tingling or discomfort in arms, back, neck, jaw, or stomach, shortness of breath, nausea, vomiting, burping or heartburn, sweating, cold and clammy skin, racing or abnormal heartbeat for more than 10 minutes or if they keep coming & going.  Call 911 and do not try to get to hospital by car.  You can help yourself with lifestyle changes (quitting smoking if you smoke), eat lots of fruits & vegetables & low fat dairy products, not a lot of meat & fatty foods, walk or some form of physical activity most days of the week, lose weight if you are overweight.  Take your cardiac medication as prescribed to lower cholesterol, to lower blood pressure, and control your blood sugar.

## 2018-08-09 NOTE — PROGRESS NOTE ADULT - SUBJECTIVE AND OBJECTIVE BOX
SANDOR AGUILAR  62y Female  MRN:[id number]    Patient is a 62y old  Female who presents with a chief complaint of Food impaction in esophagus x 1 day (08 Aug 2018 23:46)    HPI:  63 y/o F w/pmhx of seizure/epilepsy, scleroderma, pulmonary HTN on bosentan, arrhythmia (pt unsure what type), GERD, thyroid nodules, hx food impaction in the past now presenting with inability to eat solid foods and vomiting up of eaten food x 1 day. Patient reports that yesterday evening she was eating a hot dog for dinner when she felt as if part of the hot dog had gotten stuck in her chest. Patient had not thought too much of it and had gone to bed. Today she had attempted to eat lunch and was initially able to get down small amounts of food and small amounts of liquid. However, later in the afternoon and evening she began to have vomiting up of liquids, and further attempts to eat dinner were not successful with vomiting up of undigested food. Because of this her family was concerned and patient called her gastroenterologist Dr. Lucio and was told to go to the ED for treatment. Patient denies any fevers, chest pain, shortness of breath, diarrhea, or constipation. Does report that a few years ago she had weight loss of 15 pounds and frequent diarrhea but that the diarrhea had resolved though she has not been able to regain significant weight, patient does report multiple checkups of her lungs and heart and thyroid including CT chest in march as well as TTE in march. (08 Aug 2018 23:46)      Patient seen and evaluated at bedside.      Interval HPI: s/p EGD last night.     PAST MEDICAL & SURGICAL HISTORY:  Mitral regurgitation: mild  Heart murmur  GERD (gastroesophageal reflux disease)  Epilepsy  Pulmonary hypertension  Arthritis  Arrhythmia  Scleroderma  No significant past surgical history    SOC:  non smoker  no drug/alcohol abuse    fam hx:  non cont     REVIEW OF SYSTEMS:  Constitutional: No fever, chills, fatigue or weight loss.  Skin: No rash.  Eyes: No recent vision problems or eye pain.  ENT: No congestion, ear pain, or sore throat.  Endocrine: No thyroid problems.  Cardiovascular: No chest pain or palpation.  Respiratory: No cough, shortness of breath, congestion, or wheezing.  Gastrointestinal: as per hpi  Genitourinary: No dysuria.  Musculoskeletal: No joint swelling.  Neurologic: No headache.    VITALS:  Vital Signs Last 24 Hrs  T(C): 36.1 (09 Aug 2018 12:00), Max: 37 (09 Aug 2018 08:00)  T(F): 97 (09 Aug 2018 12:00), Max: 98.6 (09 Aug 2018 08:00)  HR: 69 (09 Aug 2018 12:00) (67 - 79)  BP: 137/62 (09 Aug 2018 12:00) (111/61 - 172/79)  BP(mean): 80 (09 Aug 2018 08:00) (80 - 108)  RR: 15 (09 Aug 2018 12:00) (15 - 18)  SpO2: 100% (09 Aug 2018 12:00) (97% - 100%)  CAPILLARY BLOOD GLUCOSE        I&O's Summary    08 Aug 2018 07:01  -  09 Aug 2018 07:00  --------------------------------------------------------  IN: 640 mL / OUT: 400 mL / NET: 240 mL    09 Aug 2018 07:01  -  09 Aug 2018 13:55  --------------------------------------------------------  IN: 260 mL / OUT: 0 mL / NET: 260 mL        PHYSICAL EXAM:  GENERAL: NAD, well-developed  HEAD:  Atraumatic, Normocephalic  EYES: EOMI, PERRLA, conjunctiva and sclera clear  NECK: Supple, No JVD  CHEST/LUNG: Clear to auscultation bilaterally; No wheeze  HEART: S1, S2; No murmurs, rubs, or gallops  ABDOMEN: Soft, Nontender, Nondistended; Bowel sounds present  EXTREMITIES:  2+ Peripheral Pulses, No clubbing, cyanosis, or edema  PSYCH: Normal affect  NEUROLOGY: AAOX3; non-focal  SKIN: No rashes or lesions    Consultant(s) Notes Reviewed:  [x ] YES  [ ] NO  Care Discussed with Consultants/Other Providers [ x] YES  [ ] NO    MEDS:  MEDICATIONS  (STANDING):  aspirin 325 milliGRAM(s) Oral daily  bosentan 125 milliGRAM(s) Oral two times a day  cholecalciferol 400 Unit(s) Oral daily  dextrose 5% + sodium chloride 0.45%. 1000 milliLiter(s) (80 mL/Hr) IV Continuous <Continuous>  doxazosin 2 milliGRAM(s) Oral at bedtime  ferrous    sulfate 325 milliGRAM(s) Oral two times a day  heparin  Injectable 5000 Unit(s) SubCutaneous every 12 hours  lamoTRIgine  milliGRAM(s) Oral daily  metoprolol tartrate 50 milliGRAM(s) Oral two times a day  mycophenolate mofetil 1000 milliGRAM(s) Oral at bedtime  mycophenolate mofetil 1500 milliGRAM(s) Oral once  NIFEdipine XL 60 milliGRAM(s) Oral daily  pantoprazole  Injectable 40 milliGRAM(s) IV Push every 12 hours    MEDICATIONS  (PRN):  acetaminophen  Suppository. 650 milliGRAM(s) Rectal every 6 hours PRN Moderate Pain (4 - 6)  HYDROmorphone  Injectable 0.5 milliGRAM(s) IV Push every 10 minutes PRN Moderate Pain (4 - 6)  ondansetron Injectable 4 milliGRAM(s) IV Push once PRN Nausea and/or Vomiting    ALLERGIES:  No Known Allergies      LABS:                        11.1   6.0   )-----------( 145      ( 09 Aug 2018 02:38 )             31.4     08-09    133<L>  |  97  |  11  ----------------------------<  147<H>  3.9   |  23  |  0.72    Ca    9.1      09 Aug 2018 02:38    TPro  8.7<H>  /  Alb  4.8  /  TBili  0.3  /  DBili  x   /  AST  24  /  ALT  14  /  AlkPhos  59  08-08    PT/INR - ( 08 Aug 2018 20:12 )   PT: 12.8 sec;   INR: 1.17 ratio         PTT - ( 08 Aug 2018 20:12 )  PTT:29.5 sec      LIVER FUNCTIONS - ( 08 Aug 2018 20:12 )  Alb: 4.8 g/dL / Pro: 8.7 g/dL / ALK PHOS: 59 U/L / ALT: 14 U/L / AST: 24 U/L / GGT: x             TSH: Thyroid Stimulating Hormone, Serum: 0.46 uIU/mL (08-09 @ 07:20)    < from: Upper Endoscopy (08.08.18 @ 21:34) >  Impression:          - Food in the lower third of the esophagus. Removal was successful.                       - LA Grade D esophagitis.                       - A small amount of food (residue) in the stomach.             - Normal duodenal bulb.  Recommendation:      - Admit the patient to hospital [facility].                       - NPO today.                       - Perform an esophagram tomorrow before advancing diet.                                                                < end of copied text >

## 2018-08-09 NOTE — DISCHARGE NOTE ADULT - PATIENT PORTAL LINK FT
You can access the [facility] Patient Portal, offered by [facility], by registering with the following website: [email or web address]

## 2018-08-09 NOTE — DISCHARGE NOTE ADULT - MEDICATION SUMMARY - MEDICATIONS TO CHANGE
I will SWITCH the dose or number of times a day I take the medications listed below when I get home from the hospital:    NexIUM 40 mg oral delayed release capsule  -- 1 cap(s) by mouth once a day

## 2018-11-15 ENCOUNTER — OUTPATIENT (OUTPATIENT)
Dept: OUTPATIENT SERVICES | Facility: HOSPITAL | Age: 63
LOS: 1 days | End: 2018-11-15
Payer: COMMERCIAL

## 2018-11-15 VITALS
HEIGHT: 62.5 IN | SYSTOLIC BLOOD PRESSURE: 115 MMHG | WEIGHT: 115.08 LBS | OXYGEN SATURATION: 100 % | DIASTOLIC BLOOD PRESSURE: 74 MMHG | TEMPERATURE: 98 F | RESPIRATION RATE: 16 BRPM | HEART RATE: 62 BPM

## 2018-11-15 DIAGNOSIS — M34.1 CR(E)ST SYNDROME: ICD-10-CM

## 2018-11-15 DIAGNOSIS — I27.20 PULMONARY HYPERTENSION, UNSPECIFIED: ICD-10-CM

## 2018-11-15 DIAGNOSIS — K21.0 GASTRO-ESOPHAGEAL REFLUX DISEASE WITH ESOPHAGITIS: ICD-10-CM

## 2018-11-15 DIAGNOSIS — R13.10 DYSPHAGIA, UNSPECIFIED: ICD-10-CM

## 2018-11-15 LAB
ANION GAP SERPL CALC-SCNC: 13 MMOL/L — SIGNIFICANT CHANGE UP (ref 5–17)
BUN SERPL-MCNC: 11 MG/DL — SIGNIFICANT CHANGE UP (ref 7–23)
CALCIUM SERPL-MCNC: 9.1 MG/DL — SIGNIFICANT CHANGE UP (ref 8.4–10.5)
CHLORIDE SERPL-SCNC: 101 MMOL/L — SIGNIFICANT CHANGE UP (ref 96–108)
CO2 SERPL-SCNC: 26 MMOL/L — SIGNIFICANT CHANGE UP (ref 22–31)
CREAT SERPL-MCNC: 0.8 MG/DL — SIGNIFICANT CHANGE UP (ref 0.5–1.3)
GLUCOSE SERPL-MCNC: 92 MG/DL — SIGNIFICANT CHANGE UP (ref 70–99)
HCT VFR BLD CALC: 32.5 % — LOW (ref 34.5–45)
HGB BLD-MCNC: 9.8 G/DL — LOW (ref 11.5–15.5)
MCHC RBC-ENTMCNC: 25.7 PG — LOW (ref 27–34)
MCHC RBC-ENTMCNC: 30.2 GM/DL — LOW (ref 32–36)
MCV RBC AUTO: 85.1 FL — SIGNIFICANT CHANGE UP (ref 80–100)
PLATELET # BLD AUTO: 171 K/UL — SIGNIFICANT CHANGE UP (ref 150–400)
POTASSIUM SERPL-MCNC: 4.3 MMOL/L — SIGNIFICANT CHANGE UP (ref 3.5–5.3)
POTASSIUM SERPL-SCNC: 4.3 MMOL/L — SIGNIFICANT CHANGE UP (ref 3.5–5.3)
RBC # BLD: 3.82 M/UL — SIGNIFICANT CHANGE UP (ref 3.8–5.2)
RBC # FLD: 14.2 % — SIGNIFICANT CHANGE UP (ref 10.3–14.5)
SODIUM SERPL-SCNC: 140 MMOL/L — SIGNIFICANT CHANGE UP (ref 135–145)
WBC # BLD: 4.58 K/UL — SIGNIFICANT CHANGE UP (ref 3.8–10.5)
WBC # FLD AUTO: 4.58 K/UL — SIGNIFICANT CHANGE UP (ref 3.8–10.5)

## 2018-11-15 PROCEDURE — 80048 BASIC METABOLIC PNL TOTAL CA: CPT

## 2018-11-15 PROCEDURE — 85027 COMPLETE CBC AUTOMATED: CPT

## 2018-11-15 PROCEDURE — G0463: CPT

## 2018-11-15 RX ORDER — LAMOTRIGINE 25 MG/1
1 TABLET, ORALLY DISINTEGRATING ORAL
Qty: 0 | Refills: 0 | COMMUNITY

## 2018-11-15 RX ORDER — ESOMEPRAZOLE MAGNESIUM 40 MG/1
1 CAPSULE, DELAYED RELEASE ORAL
Qty: 0 | Refills: 0 | COMMUNITY

## 2018-11-15 NOTE — H&P PST ADULT - PMH
Arrhythmia    Arthritis    Epilepsy    GERD (gastroesophageal reflux disease)    Heart murmur    Mitral regurgitation  mild  Pulmonary hypertension    Scleroderma Arrhythmia    Arthritis    Dysphagia, unspecified type    GERD (gastroesophageal reflux disease)    Heart murmur    Mitral regurgitation  mild  Pulmonary hypertension    Raynaud's disease without gangrene    Scleroderma    Seizure Arrhythmia    Arthritis    Digital blood vessel injury    Dysphagia, unspecified type    GERD (gastroesophageal reflux disease)    Heart murmur    Mitral regurgitation  mild  Pulmonary hypertension    Raynaud's disease without gangrene    Scleroderma    Seizure

## 2018-11-15 NOTE — H&P PST ADULT - RS GEN HX ROS MEA POS PC
occasional, exertional and mild in quality not causing decreased exercise/ functional capacity/dyspnea

## 2018-11-15 NOTE — H&P PST ADULT - HISTORY OF PRESENT ILLNESS
61 y/o female with PMHx of seizure x 1 4/2017, scleroderma, Raynaud's disease, digital ischemia, pulmonary HTN, arrhythmia (pt unsure what type), GERD, thyroid nodules, and pulmonary HTN, dysphagia and food impaction s/p extraction of impacted food bolus (hot dog) in 8/2018 and was found to have significant ulceration/maceration at site of impaction and food was not able to be passed distally during endoscopy.  Xray esophogram done post-procedure showed no evidence for perforation and normal esophageal contour and motility, but noted significant delay of the contrast through the gastroesophageal junction.  Findings likely associated with scleroderma.  Patient now endorsing continued dysphagia with solids @ times.  She is now scheduled to have EGD with dilation on 11/27/2018.  Patient presents today for preop evaluation. 61 y/o [place] female with PMHx of seizure x 1 4/2017, scleroderma, Raynaud's disease, digital ischemia, pulmonary HTN, arrhythmia (pt unsure what type), GERD, thyroid nodules, and pulmonary HTN, dysphagia and food impaction s/p extraction of impacted food bolus (hot dog) in 8/2018 and was found to have significant ulceration/maceration at site of impaction and food was not able to be passed distally during endoscopy.  Xray esophogram done post-procedure showed no evidence for perforation and normal esophageal contour and motility, but noted significant delay of the contrast through the gastroesophageal junction.  Findings likely associated with scleroderma.  Patient now endorsing continued dysphagia with solids @ times.  She is now scheduled to have EGD with dilation on 11/27/2018.  Patient presents today for preop evaluation. 61 y/o [place] female with PMHx of seizure x 1 4/2017, scleroderma, Raynaud's disease, digital blood vessel injury, pulmonary HTN, arrhythmia (pt unsure what type), GERD, thyroid nodules, and pulmonary HTN, dysphagia and food impaction s/p extraction of impacted food bolus (hot dog) in 8/2018 and was found to have significant ulceration/maceration at site of impaction and food was not able to be passed distally during endoscopy.  Xray esophogram done post-procedure showed no evidence for perforation and normal esophageal contour and motility, but noted significant delay of the contrast through the gastroesophageal junction.  Findings likely associated with scleroderma.  Patient now endorsing continued dysphagia with solids @ times.  She is now scheduled to have EGD with dilation on 11/27/2018.  Patient presents today for preop evaluation.

## 2018-11-15 NOTE — H&P PST ADULT - RS GEN PE MLT RESP DETAILS PC
breath sounds equal/airway patent/diminished breath sounds, R/good air movement/clear to auscultation bilaterally/respirations non-labored/diminished breath sounds, L

## 2018-11-16 PROBLEM — G40.909 EPILEPSY, UNSPECIFIED, NOT INTRACTABLE, WITHOUT STATUS EPILEPTICUS: Chronic | Status: INACTIVE | Noted: 2018-01-17 | Resolved: 2018-11-15

## 2018-11-27 ENCOUNTER — RESULT REVIEW (OUTPATIENT)
Age: 63
End: 2018-11-27

## 2018-11-27 ENCOUNTER — OUTPATIENT (OUTPATIENT)
Dept: OUTPATIENT SERVICES | Facility: HOSPITAL | Age: 63
LOS: 1 days | End: 2018-11-27
Payer: COMMERCIAL

## 2018-11-27 DIAGNOSIS — M34.1 CR(E)ST SYNDROME: ICD-10-CM

## 2018-11-27 DIAGNOSIS — R13.10 DYSPHAGIA, UNSPECIFIED: ICD-10-CM

## 2018-11-27 DIAGNOSIS — K21.0 GASTRO-ESOPHAGEAL REFLUX DISEASE WITH ESOPHAGITIS: ICD-10-CM

## 2018-11-27 PROCEDURE — 43239 EGD BIOPSY SINGLE/MULTIPLE: CPT | Mod: XS

## 2018-11-27 PROCEDURE — 43249 ESOPH EGD DILATION <30 MM: CPT

## 2018-11-28 LAB — SURGICAL PATHOLOGY STUDY: SIGNIFICANT CHANGE UP

## 2019-05-08 PROBLEM — R13.10 DYSPHAGIA, UNSPECIFIED: Chronic | Status: ACTIVE | Noted: 2018-11-15

## 2019-05-08 PROBLEM — R56.9 UNSPECIFIED CONVULSIONS: Chronic | Status: ACTIVE | Noted: 2018-11-15

## 2019-05-08 PROBLEM — S65.509A: Chronic | Status: ACTIVE | Noted: 2018-11-15

## 2019-05-08 PROBLEM — I73.00 RAYNAUD'S SYNDROME WITHOUT GANGRENE: Chronic | Status: ACTIVE | Noted: 2018-11-15

## 2019-05-13 ENCOUNTER — OUTPATIENT (OUTPATIENT)
Dept: OUTPATIENT SERVICES | Facility: HOSPITAL | Age: 64
LOS: 1 days | End: 2019-05-13
Payer: COMMERCIAL

## 2019-05-13 VITALS
TEMPERATURE: 97 F | SYSTOLIC BLOOD PRESSURE: 119 MMHG | OXYGEN SATURATION: 97 % | HEIGHT: 62 IN | HEART RATE: 76 BPM | RESPIRATION RATE: 20 BRPM | WEIGHT: 110.89 LBS | DIASTOLIC BLOOD PRESSURE: 71 MMHG

## 2019-05-13 DIAGNOSIS — R13.10 DYSPHAGIA, UNSPECIFIED: ICD-10-CM

## 2019-05-13 DIAGNOSIS — M34.1 CR(E)ST SYNDROME: ICD-10-CM

## 2019-05-13 DIAGNOSIS — K22.2 ESOPHAGEAL OBSTRUCTION: ICD-10-CM

## 2019-05-13 DIAGNOSIS — Z01.818 ENCOUNTER FOR OTHER PREPROCEDURAL EXAMINATION: ICD-10-CM

## 2019-05-13 LAB
ANION GAP SERPL CALC-SCNC: 14 MMOL/L — SIGNIFICANT CHANGE UP (ref 5–17)
BUN SERPL-MCNC: 9 MG/DL — SIGNIFICANT CHANGE UP (ref 7–23)
CALCIUM SERPL-MCNC: 9.5 MG/DL — SIGNIFICANT CHANGE UP (ref 8.4–10.5)
CHLORIDE SERPL-SCNC: 102 MMOL/L — SIGNIFICANT CHANGE UP (ref 96–108)
CO2 SERPL-SCNC: 23 MMOL/L — SIGNIFICANT CHANGE UP (ref 22–31)
CREAT SERPL-MCNC: 0.77 MG/DL — SIGNIFICANT CHANGE UP (ref 0.5–1.3)
GLUCOSE SERPL-MCNC: 106 MG/DL — HIGH (ref 70–99)
HCT VFR BLD CALC: 35.5 % — SIGNIFICANT CHANGE UP (ref 34.5–45)
HGB BLD-MCNC: 10.8 G/DL — LOW (ref 11.5–15.5)
MCHC RBC-ENTMCNC: 26.2 PG — LOW (ref 27–34)
MCHC RBC-ENTMCNC: 30.4 GM/DL — LOW (ref 32–36)
MCV RBC AUTO: 86.2 FL — SIGNIFICANT CHANGE UP (ref 80–100)
PLATELET # BLD AUTO: 163 K/UL — SIGNIFICANT CHANGE UP (ref 150–400)
POTASSIUM SERPL-MCNC: 4.3 MMOL/L — SIGNIFICANT CHANGE UP (ref 3.5–5.3)
POTASSIUM SERPL-SCNC: 4.3 MMOL/L — SIGNIFICANT CHANGE UP (ref 3.5–5.3)
RBC # BLD: 4.12 M/UL — SIGNIFICANT CHANGE UP (ref 3.8–5.2)
RBC # FLD: 14.5 % — SIGNIFICANT CHANGE UP (ref 10.3–14.5)
SODIUM SERPL-SCNC: 139 MMOL/L — SIGNIFICANT CHANGE UP (ref 135–145)
WBC # BLD: 4.43 K/UL — SIGNIFICANT CHANGE UP (ref 3.8–10.5)
WBC # FLD AUTO: 4.43 K/UL — SIGNIFICANT CHANGE UP (ref 3.8–10.5)

## 2019-05-13 PROCEDURE — G0463: CPT

## 2019-05-13 PROCEDURE — 85027 COMPLETE CBC AUTOMATED: CPT

## 2019-05-13 PROCEDURE — 80048 BASIC METABOLIC PNL TOTAL CA: CPT

## 2019-05-13 RX ORDER — ACETAMINOPHEN 500 MG
2 TABLET ORAL
Qty: 0 | Refills: 0 | DISCHARGE

## 2019-05-13 NOTE — H&P PST ADULT - NSICDXPASTMEDICALHX_GEN_ALL_CORE_FT
PAST MEDICAL HISTORY:  Arrhythmia     Arthritis     Digital blood vessel injury     Dysphagia, unspecified type     GERD (gastroesophageal reflux disease)     Heart murmur     Mitral regurgitation mild    Pulmonary hypertension     Raynaud's disease without gangrene     Scleroderma     Seizure

## 2019-05-13 NOTE — H&P PST ADULT - NSICDXPROBLEM_GEN_ALL_CORE_FT
PROBLEM DIAGNOSES  Problem: Dysphagia  Assessment and Plan: EGD with dilation anesthesia  Aspirin held for 1 wk ad per Dr. Lucio and Dr. Agarwal

## 2019-05-16 ENCOUNTER — OUTPATIENT (OUTPATIENT)
Dept: OUTPATIENT SERVICES | Facility: HOSPITAL | Age: 64
LOS: 1 days | End: 2019-05-16
Payer: COMMERCIAL

## 2019-05-16 DIAGNOSIS — M34.1 CR(E)ST SYNDROME: ICD-10-CM

## 2019-05-16 DIAGNOSIS — K22.2 ESOPHAGEAL OBSTRUCTION: ICD-10-CM

## 2019-05-16 DIAGNOSIS — R13.10 DYSPHAGIA, UNSPECIFIED: ICD-10-CM

## 2019-05-16 PROCEDURE — 43249 ESOPH EGD DILATION <30 MM: CPT

## 2019-05-16 PROCEDURE — C1726: CPT

## 2020-05-12 ENCOUNTER — OUTPATIENT (OUTPATIENT)
Dept: OUTPATIENT SERVICES | Facility: HOSPITAL | Age: 65
LOS: 1 days | End: 2020-05-12
Payer: COMMERCIAL

## 2020-05-12 DIAGNOSIS — Z11.59 ENCOUNTER FOR SCREENING FOR OTHER VIRAL DISEASES: ICD-10-CM

## 2020-05-12 PROCEDURE — U0003: CPT

## 2020-05-13 LAB — SARS-COV-2 RNA SPEC QL NAA+PROBE: SIGNIFICANT CHANGE UP

## 2020-05-14 ENCOUNTER — OUTPATIENT (OUTPATIENT)
Dept: OUTPATIENT SERVICES | Facility: HOSPITAL | Age: 65
LOS: 1 days | End: 2020-05-14
Payer: COMMERCIAL

## 2020-05-14 DIAGNOSIS — R13.10 DYSPHAGIA, UNSPECIFIED: ICD-10-CM

## 2020-05-14 PROCEDURE — 43249 ESOPH EGD DILATION <30 MM: CPT

## 2020-05-14 PROCEDURE — C1726: CPT

## 2020-11-04 ENCOUNTER — INPATIENT (INPATIENT)
Facility: HOSPITAL | Age: 65
LOS: 1 days | Discharge: HOME | DRG: 545 | End: 2020-11-06
Attending: GENERAL ACUTE CARE HOSPITAL | Admitting: STUDENT IN AN ORGANIZED HEALTH CARE EDUCATION/TRAINING PROGRAM
Payer: COMMERCIAL

## 2020-11-04 VITALS
DIASTOLIC BLOOD PRESSURE: 76 MMHG | SYSTOLIC BLOOD PRESSURE: 152 MMHG | RESPIRATION RATE: 16 BRPM | HEART RATE: 68 BPM | TEMPERATURE: 98 F | HEIGHT: 62 IN | OXYGEN SATURATION: 97 %

## 2020-11-04 DIAGNOSIS — I73.00 RAYNAUD'S SYNDROME WITHOUT GANGRENE: ICD-10-CM

## 2020-11-04 DIAGNOSIS — I27.20 PULMONARY HYPERTENSION, UNSPECIFIED: ICD-10-CM

## 2020-11-04 DIAGNOSIS — E04.1 NONTOXIC SINGLE THYROID NODULE: ICD-10-CM

## 2020-11-04 DIAGNOSIS — M34.9 SYSTEMIC SCLEROSIS, UNSPECIFIED: ICD-10-CM

## 2020-11-04 DIAGNOSIS — K22.2 ESOPHAGEAL OBSTRUCTION: ICD-10-CM

## 2020-11-04 DIAGNOSIS — Z29.9 ENCOUNTER FOR PROPHYLACTIC MEASURES, UNSPECIFIED: ICD-10-CM

## 2020-11-04 DIAGNOSIS — Z02.9 ENCOUNTER FOR ADMINISTRATIVE EXAMINATIONS, UNSPECIFIED: ICD-10-CM

## 2020-11-04 DIAGNOSIS — G40.909 EPILEPSY, UNSPECIFIED, NOT INTRACTABLE, WITHOUT STATUS EPILEPTICUS: ICD-10-CM

## 2020-11-04 DIAGNOSIS — I10 ESSENTIAL (PRIMARY) HYPERTENSION: ICD-10-CM

## 2020-11-04 DIAGNOSIS — R63.4 ABNORMAL WEIGHT LOSS: ICD-10-CM

## 2020-11-04 DIAGNOSIS — K21.9 GASTRO-ESOPHAGEAL REFLUX DISEASE WITHOUT ESOPHAGITIS: ICD-10-CM

## 2020-11-04 DIAGNOSIS — R13.10 DYSPHAGIA, UNSPECIFIED: ICD-10-CM

## 2020-11-04 LAB
ALBUMIN SERPL ELPH-MCNC: 4.6 G/DL — SIGNIFICANT CHANGE UP (ref 3.3–5)
ALP SERPL-CCNC: 62 U/L — SIGNIFICANT CHANGE UP (ref 40–120)
ALT FLD-CCNC: 6 U/L — LOW (ref 10–45)
ANION GAP SERPL CALC-SCNC: 12 MMOL/L — SIGNIFICANT CHANGE UP (ref 5–17)
APTT BLD: 30.8 SEC — SIGNIFICANT CHANGE UP (ref 27.5–35.5)
AST SERPL-CCNC: 13 U/L — SIGNIFICANT CHANGE UP (ref 10–40)
BASOPHILS # BLD AUTO: 0.05 K/UL — SIGNIFICANT CHANGE UP (ref 0–0.2)
BASOPHILS NFR BLD AUTO: 1 % — SIGNIFICANT CHANGE UP (ref 0–2)
BILIRUB SERPL-MCNC: 0.2 MG/DL — SIGNIFICANT CHANGE UP (ref 0.2–1.2)
BUN SERPL-MCNC: 11 MG/DL — SIGNIFICANT CHANGE UP (ref 7–23)
CALCIUM SERPL-MCNC: 9.7 MG/DL — SIGNIFICANT CHANGE UP (ref 8.4–10.5)
CHLORIDE SERPL-SCNC: 102 MMOL/L — SIGNIFICANT CHANGE UP (ref 96–108)
CO2 SERPL-SCNC: 26 MMOL/L — SIGNIFICANT CHANGE UP (ref 22–31)
CREAT SERPL-MCNC: 0.73 MG/DL — SIGNIFICANT CHANGE UP (ref 0.5–1.3)
EOSINOPHIL # BLD AUTO: 0.11 K/UL — SIGNIFICANT CHANGE UP (ref 0–0.5)
EOSINOPHIL NFR BLD AUTO: 2.3 % — SIGNIFICANT CHANGE UP (ref 0–6)
GLUCOSE BLDC GLUCOMTR-MCNC: 81 MG/DL — SIGNIFICANT CHANGE UP (ref 70–99)
GLUCOSE SERPL-MCNC: 89 MG/DL — SIGNIFICANT CHANGE UP (ref 70–99)
HCT VFR BLD CALC: 34.4 % — LOW (ref 34.5–45)
HGB BLD-MCNC: 10.5 G/DL — LOW (ref 11.5–15.5)
IMM GRANULOCYTES NFR BLD AUTO: 0.2 % — SIGNIFICANT CHANGE UP (ref 0–1.5)
INR BLD: 1.05 RATIO — SIGNIFICANT CHANGE UP (ref 0.88–1.16)
LIDOCAIN IGE QN: 42 U/L — SIGNIFICANT CHANGE UP (ref 7–60)
LYMPHOCYTES # BLD AUTO: 0.84 K/UL — LOW (ref 1–3.3)
LYMPHOCYTES # BLD AUTO: 17.4 % — SIGNIFICANT CHANGE UP (ref 13–44)
MCHC RBC-ENTMCNC: 26.1 PG — LOW (ref 27–34)
MCHC RBC-ENTMCNC: 30.5 GM/DL — LOW (ref 32–36)
MCV RBC AUTO: 85.4 FL — SIGNIFICANT CHANGE UP (ref 80–100)
MONOCYTES # BLD AUTO: 0.48 K/UL — SIGNIFICANT CHANGE UP (ref 0–0.9)
MONOCYTES NFR BLD AUTO: 9.9 % — SIGNIFICANT CHANGE UP (ref 2–14)
NEUTROPHILS # BLD AUTO: 3.34 K/UL — SIGNIFICANT CHANGE UP (ref 1.8–7.4)
NEUTROPHILS NFR BLD AUTO: 69.2 % — SIGNIFICANT CHANGE UP (ref 43–77)
NRBC # BLD: 0 /100 WBCS — SIGNIFICANT CHANGE UP (ref 0–0)
PLATELET # BLD AUTO: 163 K/UL — SIGNIFICANT CHANGE UP (ref 150–400)
POTASSIUM SERPL-MCNC: 4.3 MMOL/L — SIGNIFICANT CHANGE UP (ref 3.5–5.3)
POTASSIUM SERPL-SCNC: 4.3 MMOL/L — SIGNIFICANT CHANGE UP (ref 3.5–5.3)
PROT SERPL-MCNC: 7.4 G/DL — SIGNIFICANT CHANGE UP (ref 6–8.3)
PROTHROM AB SERPL-ACNC: 12.6 SEC — SIGNIFICANT CHANGE UP (ref 10.6–13.6)
RBC # BLD: 4.03 M/UL — SIGNIFICANT CHANGE UP (ref 3.8–5.2)
RBC # FLD: 13.2 % — SIGNIFICANT CHANGE UP (ref 10.3–14.5)
SARS-COV-2 RNA SPEC QL NAA+PROBE: SIGNIFICANT CHANGE UP
SODIUM SERPL-SCNC: 140 MMOL/L — SIGNIFICANT CHANGE UP (ref 135–145)
WBC # BLD: 4.83 K/UL — SIGNIFICANT CHANGE UP (ref 3.8–10.5)
WBC # FLD AUTO: 4.83 K/UL — SIGNIFICANT CHANGE UP (ref 3.8–10.5)

## 2020-11-04 PROCEDURE — 99285 EMERGENCY DEPT VISIT HI MDM: CPT

## 2020-11-04 PROCEDURE — 71045 X-RAY EXAM CHEST 1 VIEW: CPT | Mod: 26

## 2020-11-04 PROCEDURE — 99223 1ST HOSP IP/OBS HIGH 75: CPT

## 2020-11-04 RX ORDER — CHOLECALCIFEROL (VITAMIN D3) 125 MCG
1 CAPSULE ORAL
Qty: 0 | Refills: 0 | DISCHARGE

## 2020-11-04 RX ORDER — LAMOTRIGINE 25 MG/1
1 TABLET, ORALLY DISINTEGRATING ORAL
Qty: 0 | Refills: 0 | DISCHARGE

## 2020-11-04 RX ORDER — PRAZOSIN HCL 2 MG
2 CAPSULE ORAL
Qty: 0 | Refills: 0 | DISCHARGE

## 2020-11-04 RX ORDER — LAMOTRIGINE 25 MG/1
2 TABLET, ORALLY DISINTEGRATING ORAL
Qty: 0 | Refills: 0 | DISCHARGE

## 2020-11-04 RX ORDER — DEXTROSE 50 % IN WATER 50 %
25 SYRINGE (ML) INTRAVENOUS ONCE
Refills: 0 | Status: DISCONTINUED | OUTPATIENT
Start: 2020-11-04 | End: 2020-11-06

## 2020-11-04 RX ORDER — MYCOPHENOLATE MOFETIL 250 MG/1
1500 CAPSULE ORAL
Refills: 0 | Status: DISCONTINUED | OUTPATIENT
Start: 2020-11-05 | End: 2020-11-06

## 2020-11-04 RX ORDER — GLUCAGON INJECTION, SOLUTION 0.5 MG/.1ML
1 INJECTION, SOLUTION SUBCUTANEOUS ONCE
Refills: 0 | Status: DISCONTINUED | OUTPATIENT
Start: 2020-11-04 | End: 2020-11-06

## 2020-11-04 RX ORDER — METOPROLOL TARTRATE 50 MG
50 TABLET ORAL
Refills: 0 | Status: DISCONTINUED | OUTPATIENT
Start: 2020-11-04 | End: 2020-11-06

## 2020-11-04 RX ORDER — SODIUM CHLORIDE 9 MG/ML
1000 INJECTION, SOLUTION INTRAVENOUS
Refills: 0 | Status: DISCONTINUED | OUTPATIENT
Start: 2020-11-04 | End: 2020-11-06

## 2020-11-04 RX ORDER — PANTOPRAZOLE SODIUM 20 MG/1
40 TABLET, DELAYED RELEASE ORAL
Refills: 0 | Status: DISCONTINUED | OUTPATIENT
Start: 2020-11-04 | End: 2020-11-05

## 2020-11-04 RX ORDER — MYCOPHENOLATE MOFETIL 250 MG/1
1000 CAPSULE ORAL AT BEDTIME
Refills: 0 | Status: DISCONTINUED | OUTPATIENT
Start: 2020-11-04 | End: 2020-11-06

## 2020-11-04 RX ORDER — FERROUS GLUCONATE 100 %
1 POWDER (GRAM) MISCELLANEOUS
Qty: 0 | Refills: 0 | DISCHARGE

## 2020-11-04 RX ORDER — NIFEDIPINE 30 MG
60 TABLET, EXTENDED RELEASE 24 HR ORAL DAILY
Refills: 0 | Status: DISCONTINUED | OUTPATIENT
Start: 2020-11-04 | End: 2020-11-06

## 2020-11-04 RX ORDER — METOPROLOL TARTRATE 50 MG
1 TABLET ORAL
Qty: 0 | Refills: 0 | DISCHARGE

## 2020-11-04 RX ORDER — ASPIRIN/CALCIUM CARB/MAGNESIUM 324 MG
1 TABLET ORAL
Qty: 0 | Refills: 0 | DISCHARGE

## 2020-11-04 RX ORDER — DEXTROSE 50 % IN WATER 50 %
15 SYRINGE (ML) INTRAVENOUS ONCE
Refills: 0 | Status: DISCONTINUED | OUTPATIENT
Start: 2020-11-04 | End: 2020-11-06

## 2020-11-04 RX ORDER — DEXTROSE 50 % IN WATER 50 %
12.5 SYRINGE (ML) INTRAVENOUS ONCE
Refills: 0 | Status: DISCONTINUED | OUTPATIENT
Start: 2020-11-04 | End: 2020-11-06

## 2020-11-04 RX ORDER — HEPARIN SODIUM 5000 [USP'U]/ML
5000 INJECTION INTRAVENOUS; SUBCUTANEOUS EVERY 12 HOURS
Refills: 0 | Status: DISCONTINUED | OUTPATIENT
Start: 2020-11-04 | End: 2020-11-06

## 2020-11-04 RX ORDER — DOXAZOSIN MESYLATE 4 MG
2 TABLET ORAL AT BEDTIME
Refills: 0 | Status: DISCONTINUED | OUTPATIENT
Start: 2020-11-04 | End: 2020-11-06

## 2020-11-04 RX ORDER — ESOMEPRAZOLE MAGNESIUM 40 MG/1
1 CAPSULE, DELAYED RELEASE ORAL
Qty: 0 | Refills: 0 | DISCHARGE

## 2020-11-04 RX ORDER — LAMOTRIGINE 25 MG/1
200 TABLET, ORALLY DISINTEGRATING ORAL
Refills: 0 | Status: DISCONTINUED | OUTPATIENT
Start: 2020-11-04 | End: 2020-11-06

## 2020-11-04 RX ORDER — INFLUENZA VIRUS VACCINE 15; 15; 15; 15 UG/.5ML; UG/.5ML; UG/.5ML; UG/.5ML
0.5 SUSPENSION INTRAMUSCULAR ONCE
Refills: 0 | Status: DISCONTINUED | OUTPATIENT
Start: 2020-11-04 | End: 2020-11-06

## 2020-11-04 RX ORDER — FAMOTIDINE 10 MG/ML
1 INJECTION INTRAVENOUS
Qty: 0 | Refills: 0 | DISCHARGE

## 2020-11-04 RX ADMIN — Medication 50 MILLIGRAM(S): at 19:53

## 2020-11-04 RX ADMIN — MYCOPHENOLATE MOFETIL 1000 MILLIGRAM(S): 250 CAPSULE ORAL at 22:43

## 2020-11-04 NOTE — CONSULT NOTE ADULT - ASSESSMENT
64 year old female w hx of scleroderma esophagus enquiring multiple dilationt presenting with progressive dysphagia

## 2020-11-04 NOTE — ED PROVIDER NOTE - PROGRESS NOTE DETAILS
Page placed to Dr. Lucio at this time. D/w Dr. Remi Lucio - with difficulty swallowing and prior distal esophageal strictures that were last dilated in May this year.  Low threshold to admit - If admitted, Dr. Nelson aHrris to perform dilation. Discussed dr. godwin recommendations with patient and her daughter in law. Patient willing to stay for procedure. Patient endorsed to hospitalist Dr. Arceo accepting.

## 2020-11-04 NOTE — ED PROVIDER NOTE - OBJECTIVE STATEMENT
The patient is a 64 year old female with hx of scleroderma presenting for difficulty swallowing x 10 days to hard foods. Symptoms started suddenly at rest, have been constant and worsening, with no worsening or alleviating factors. No medications taken. As per patient patients GI doctor sent her in. Patient has had multiple EGD for similar symptoms with ballooning of esophagus with last time in may. Patient denies chest pain, sob, fever, chills, headache, nausea, emesis, diarrhea, abdominal pain, hematuria/dysuria or lower extremity swelling. The patient is a 64 year old female with hx of scleroderma presenting for difficulty swallowing x 10 days to hard foods. Symptoms started suddenly at rest, have been constant and worsening, with no worsening or alleviating factors. No medications taken. As per patient patients GI doctor sent her in. Patient has had multiple EGD for similar symptoms with ballooning of esophagus with last time in may. Patient denies chest pain, sob, fever, chills, headache, nausea, emesis, diarrhea, abdominal pain, hematuria/dysuria or lower extremity swelling.      Attn - pt seen in OR8 - agree with above - pt with hx of Scleroderma and CREST.  pt having intermittent difficulty swallowing.  feels weak.  Last dilated in May 2020.   hx of distal esophageal strictures.  Sent to ER by her GI Dr. Remi Lucio.

## 2020-11-04 NOTE — CONSULT NOTE ADULT - ASSESSMENT
Differential diagnosis and plan of care discussed with patient after the evaluation  75 Minutes spent on total encounter of which more than fifty percent of the encounter was spent counseling and/or coordinating care by the attending physician.  Advanced care planning was discussed with the patient and/or surrogate decision makers. Advanced care planning forms were discussed. The risks benefits and alternatives to pertinent gastrointestinal procedures and interventions were discussed in detail and all questions were answered. Duration: 30 Minutes.      Orestes Harris M.D.   Gastroenterology and Hepatology  Cell: [phone]

## 2020-11-04 NOTE — PROVIDER CONTACT NOTE (OTHER) - ASSESSMENT
pt a&Ox4, VSS, states she normally takes Prazosin at home and only takes the sildenafil when she is sick.

## 2020-11-04 NOTE — ED PROVIDER NOTE - CLINICAL SUMMARY MEDICAL DECISION MAKING FREE TEXT BOX
Attn - pt with Scleroderma with hx of prior esophageal stricture and dilations with diff swallowing, sent to ER by her GI Dr. Remi Lucio for IV hydration and dilation by Dr. Nelson Harris.  Labs, IV hydration, admission.

## 2020-11-04 NOTE — CONSULT NOTE ADULT - PROBLEM SELECTOR RECOMMENDATION 3
Likely due to dysphagia. Plan for EGD tomorrow to assess the situation and hopefully provide therapeutic dilation.   -nutrition consult

## 2020-11-04 NOTE — ED ADULT NURSE NOTE - OBJECTIVE STATEMENT
Pt complaining of throat pain and trouble swallowing x 10 days, worsening today.  Pt with hx scleroderma, dysphagia and food impactions requiring extraction reports that she is having difficulty swallowing and is needing to make herself vomit.  Pt is conversive without difficulty, abdomen soft/non-distended/non-tender, denies chest pain, shortness of breath, cough, abdominal pain, nausea, diarrhea, fevers, chills, urinary symptoms, falls.  Able to swallow medications and drink/eat small amount this morning.

## 2020-11-04 NOTE — H&P ADULT - PROBLEM SELECTOR PLAN 8
Transitions of Care Status:  1.  Name of PCP:     Avram Goldberg MD (rheumatology)  [phone]  2.  PCP Contacted on Admission: [ ] Y    [x ] N    3.  PCP contacted at Discharge: [ ] Y    [ ] N    [ ] N/A  4.  Post-Discharge Appointment Date and Location:  5.  Summary of Handoff given to PCP:

## 2020-11-04 NOTE — CONSULT NOTE ADULT - PROBLEM SELECTOR RECOMMENDATION 9
Suspect this is due to progression of her stricture. There does not seem to be food impacted based on the lack of foreign body sensation and pt appears in not distress, tolerating her secreations.  -plan for EGD tomorrow with possible dilation fo the stricture

## 2020-11-04 NOTE — H&P ADULT - NSHPLABSRESULTS_GEN_ALL_CORE
WBC 4.8    Hgb 10.5    Platelets of 163K>    INR 1.0    Random glucose of 89.    Cr 0.7    K+ 4.3    chest radiograph reviewed with no infiltrate or effusion.    Alb 4.6    COVID-19 PCR >>sent.    EKG ordered. WBC 4.8    Hgb 10.5    Platelets of 163K>    INR 1.0    Random glucose of 89.    Cr 0.7    K+ 4.3    chest radiograph reviewed with no infiltrate or effusion.    Alb 4.6    COVID-19 PCR >>sent.    EKG ordered and reviewed with sinus bradycardia at 58 with RBBB and LAFB.

## 2020-11-04 NOTE — H&P ADULT - NSHPREVIEWOFSYSTEMS_GEN_ALL_CORE
No fever, no chills, no rigors.  No chest pain/pressure.  No palpitations.  NO cough, no dyspnoea, no wheezing.  No HA, no focal weakness.  NO reported ictal episodes.    Weight loss as above.  No back pain, no tearing back pain.  NO breast symptoms.  NO dysuria, no hematuria.  NO vaginal bleeding.  NO SI/HI.

## 2020-11-04 NOTE — H&P ADULT - ASSESSMENT
NIGHT HOSPITALIST:  Self referral for recurrence of difficulty with swallowing in the context of patient with systemic sclerosis with sclerodactyly, oesophageal involvement, pulmonary HTN, history of undifferentiated thyroid nodules, seizure disorder, seen by GI for planned EGD.   Will temporarily HOLD the ASA, and vitamin supplements, NSAID for now.    Would clarify availability of bosentan (non-formulary) with family.   Patient also on sildenafil for pulm HTN.  Also on prazosin and nifedipine, metoprolol for HTN.    Will continue with patient's Lamictal 200 mg BID.    On Cellcept 1500 mg in AM and 1000 mg at bedtime for patient's scleroderma.    Will obtain TFT and thyroid US, but would review with patient's outpatient physician in the AM if patient has had prior oesophageal motility studies. NIGHT HOSPITALIST:  Self referral for recurrence of difficulty with swallowing in the context of patient with systemic sclerosis with sclerodactyly, oesophageal involvement, pulmonary HTN, history of undifferentiated thyroid nodules, seizure disorder, seen by GI for planned EGD.   Will temporarily HOLD the ASA, and vitamin supplements, NSAID for now.    Would clarify availability of bosentan (non-formulary) with family.   Patient also on sildenafil for pulm HTN.  Also on prazosin and nifedipine, metoprolol for HTN.    Will continue with patient's Lamictal 200 mg BID.    On Cellcept 1500 mg in AM and 1000 mg at bedtime for patient's scleroderma.    Will obtain TFT and thyroid US, but would review with patient's outpatient physician in the AM if patient has had prior oesophageal motility studies.    Will follow FS to monitor for hypoglycemia.

## 2020-11-04 NOTE — H&P ADULT - NSHPSOCIALHISTORY_GEN_ALL_CORE
NO tobacco or ethanol use.   Patient brought in by daughter in law but patient did not wish for examiner to contact family at this time.

## 2020-11-04 NOTE — ED PROVIDER NOTE - NS ED ROS FT
Constitutional: No fever or chills  Eyes: No visual changes, eye pain or redness  HEENT: No ear pain, nasal pain. No nose bleeding.  (+) difficulty swallowing  CV: No chest pain or lower extremity edema  Resp: No SOB no cough  GI: No abd pain. No nausea or vomiting. No diarrhea. No constipation.   : No dysuria, hematuria.   MSK: No musculoskeletal pain  Skin: No rash  Neuro: No headache. No numbness or tingling. No weakness.

## 2020-11-04 NOTE — ED CLERICAL - NS ED CLERK NOTE PRE-ARRIVAL INFORMATION; ADDITIONAL PRE-ARRIVAL INFORMATION
CC/Reason For referral: cannot swallow; cannot urinate; sclerodermia; History of esophageal food impaction; ATTN: Steven   Preferred Consultant(if applicable):  Who admits for you (if needed):  Do you have documents you would like to fax over?  Would you still like to speak to an ED attending?

## 2020-11-04 NOTE — ED ADULT NURSE NOTE - PMH
Arrhythmia    Arthritis    Digital blood vessel injury    Dysphagia, unspecified type    GERD (gastroesophageal reflux disease)    Heart murmur    Mitral regurgitation  mild  Pulmonary hypertension    Raynaud's disease without gangrene    Scleroderma    Seizure

## 2020-11-04 NOTE — PROVIDER CONTACT NOTE (OTHER) - ACTION/TREATMENT ORDERED:
PA contact md herrmann to clarify, risk and benefits explained to patient, pt states she wants to take her own Prazosin. PT encouraged to have family bring in home medications primary team to readdress A

## 2020-11-04 NOTE — CONSULT NOTE ADULT - SUBJECTIVE AND OBJECTIVE BOX
Chief Complaint:  Patient is a 64y old  Female who presents with a chief complaint of Difficulty with swallowing with solids for the past 10 days (04 Nov 2020 17:17)      HPI:    The patient is a 64 year old female with systemic sclerosis on multiple immunosupressants, pulmonary fibrosis who presents with 10 days of progressive dysphagia to solids and liquids. She ha a known peptic stricture, last dilated 5/20.      The patient denies  abdominal pain, diarrhea, unintentional weight loss, or NSAID use. She reports losing and unquantifued amount of weight.  She reports no difficulty with managing her saliva. She denies a foreign body sensation.     Her last endoscopy was in May where she had dilation by Dr. Lucio.      Allergies:  No Known Allergies      Home Medications:    Hospital Medications:  dextrose 40% Gel 15 Gram(s) Oral once PRN  dextrose 5%. 1000 milliLiter(s) IV Continuous <Continuous>  dextrose 50% Injectable 12.5 Gram(s) IV Push once  dextrose 50% Injectable 25 Gram(s) IV Push once  dextrose 50% Injectable 25 Gram(s) IV Push once  doxazosin 2 milliGRAM(s) Oral at bedtime  glucagon  Injectable 1 milliGRAM(s) IntraMuscular once PRN  heparin   Injectable 5000 Unit(s) SubCutaneous every 12 hours  influenza   Vaccine 0.5 milliLiter(s) IntraMuscular once  lamoTRIgine 200 milliGRAM(s) Oral two times a day  metoprolol tartrate 50 milliGRAM(s) Oral two times a day  mycophenolate mofetil 1000 milliGRAM(s) Oral at bedtime  NIFEdipine XL 60 milliGRAM(s) Oral daily  pantoprazole    Tablet 40 milliGRAM(s) Oral before breakfast  sildenafil (REVATIO) 20 milliGRAM(s) Oral three times a day      PMHX/PSHX:  Digital blood vessel injury    Dysphagia, unspecified type    Seizure    Raynaud&#x27;s disease without gangrene    Mitral regurgitation    Heart murmur    GERD (gastroesophageal reflux disease)    Epilepsy    Pulmonary hypertension    Arthritis    Arrhythmia    Scleroderma    No significant past surgical history        Family history:  No pertinent family history in first degree relatives    No pertinent family history in first degree relatives        Social History:   Denies ethanol use.  Denies illicit drug use.    ROS:     General:  + wt loss, fevers, chills, night sweats, fatigue,   Eyes:  Good vision, no reported pain  ENT:  No sore throat, pain, runny nose, dysphagia  CV:  No pain, palpitations, hypo/hypertension  Resp:  No dyspnea, cough, tachypnea, wheezing  GI:  See HPI  :  No pain, bleeding, incontinence, nocturia  Muscle:  No pain, weakness  Neuro:  No weakness, tingling, memory problems  Psych:  No fatigue, insomnia, mood problems, depression  Endocrine:  No polyuria, polydipsia, cold/heat intolerance  Heme:  No petechiae, ecchymosis, easy bruisability  Integumentary:  No rash, edema      PHYSICAL EXAM:     GENERAL:  Appears stated age, well-groomed, thin, no distress  HEENT:  NC/AT,  conjunctivae anicteric, clear and pink,   NECK: supple, trachea midline  CHEST:  Full & symmetric excursion, no increased effort, breath sounds clear  HEART:  Regular rhythm, no JVD  ABDOMEN:  Soft, non-tender, non-distended, normoactive bowel sounds,  no masses , no hepatosplenomegaly  EXTREMITIES:  no cyanosis,clubbing or edema  SKIN:  No rash, erythema, or, ecchymoses, no jaundice, sclerodactyl  NEURO:  Alert, non-focal, no asterixis  PSYCH: Appropriate affect, oriented to place and time  RECTAL: Deferred      Vital Signs:  Vital Signs Last 24 Hrs  T(C): 36.5 (04 Nov 2020 21:20), Max: 36.7 (04 Nov 2020 16:30)  T(F): 97.7 (04 Nov 2020 21:20), Max: 98.1 (04 Nov 2020 19:35)  HR: 60 (04 Nov 2020 21:20) (60 - 69)  BP: 146/88 (04 Nov 2020 21:20) (146/88 - 164/62)  BP(mean): --  RR: 18 (04 Nov 2020 21:20) (16 - 18)  SpO2: 98% (04 Nov 2020 21:20) (97% - 99%)  Daily Height in cm: 157.48 (04 Nov 2020 21:20)    Daily     LABS:                        10.5   4.83  )-----------( 163      ( 04 Nov 2020 13:50 )             34.4     11-04    140  |  102  |  11  ----------------------------<  89  4.3   |  26  |  0.73    Ca    9.7      04 Nov 2020 13:50    TPro  7.4  /  Alb  4.6  /  TBili  0.2  /  DBili  x   /  AST  13  /  ALT  6<L>  /  AlkPhos  62  11-04    LIVER FUNCTIONS - ( 04 Nov 2020 13:50 )  Alb: 4.6 g/dL / Pro: 7.4 g/dL / ALK PHOS: 62 U/L / ALT: 6 U/L / AST: 13 U/L / GGT: x           PT/INR - ( 04 Nov 2020 16:31 )   PT: 12.6 sec;   INR: 1.05 ratio         PTT - ( 04 Nov 2020 16:31 )  PTT:30.8 sec    Amylase Serum--      Lipase serum42       Ammonia--

## 2020-11-04 NOTE — ED PROVIDER NOTE - PHYSICAL EXAMINATION
· Physical Examination: PHYSICAL EXAM:   · CONSTITUTIONAL:  Appearance: well appearing.  Development: well developed.  Distress: no apparent  · Manner: appropriate for situation.  Mentation: awake, alert, oriented to person, place, time/situation  · Mood: appropriate.  Nourishment: well  · Head Shape: normal cephalic, ATRAUMATIC  · EYES: bilateral normal, no discharge, redness or evidence of any abnormality  · Nose: clear Mouth: normal mucosa  · Throat: uvula midline, no vesicles, no redness, and no oropharyngeal exudate.  · CARDIAC:  CARDIAC RHYTHM: regular  CARDIAC RATE: normal  CARDIAC PEDAL EDEMA: absent  CARDIAC JVD: non-distended bilaterally  · CARDIAC PULSES: normal bilaterally  · RESPIRATORY:  Respiratory Distress: no  Breath Sounds: normal  · Chest Exam: normal, non-tender  · Abdominal Exam: soft, nondistended, nontender  · MUSCULOSKELETAL: Spine appears normal, range of motion is not limited, no muscle or joint tenderness  · NEUROLOGICAL: Alert and oriented, no focal deficits, no motor or sensory deficits.  · SKIN: Skin normal color for race, warm, dry and intact. No evidence of rash.  · PSYCHIATRIC: Alert and oriented to person, place, time/situation. normal mood and affect. no apparent risk to self or others. · Physical Examination: PHYSICAL EXAM:   · CONSTITUTIONAL:  Appearance: well appearing.  Development: well developed.  Distress: no apparent  · Manner: appropriate for situation.  Mentation: awake, alert, oriented to person, place, time/situation  · Mood: appropriate.  Nourishment: well  · Head Shape: normal cephalic, ATRAUMATIC  · EYES: bilateral normal, no discharge, redness or evidence of any abnormality  · Nose: clear Mouth: normal mucosa  · Throat: uvula midline, no vesicles, no redness, and no oropharyngeal exudate.  · CARDIAC:  CARDIAC RHYTHM: regular  CARDIAC RATE: normal  CARDIAC PEDAL EDEMA: absent  CARDIAC JVD: non-distended bilaterally  · CARDIAC PULSES: normal bilaterally  · RESPIRATORY:  Respiratory Distress: no  Breath Sounds: normal  · Chest Exam: normal, non-tender  · Abdominal Exam: soft, nondistended, nontender  · MUSCULOSKELETAL: Spine appears normal, range of motion is not limited, no muscle or joint tenderness  · NEUROLOGICAL: Alert and oriented, no focal deficits, no motor or sensory deficits.  · SKIN: Skin normal color for race, warm, dry and intact. No evidence of rash.  · PSYCHIATRIC: Alert and oriented to person, place, time/situation. normal mood and affect. no apparent risk to self or others.     Attn - alert, thin and cachetic, Scleroderma skin changes.  very dry oral mm.  neck - no masses or tenderness, no stridor.  Lungs - clear, Cor - rr, no M, Abdo - flat, soft, NT, ND, no CVAT.  Ext - scleroderma changes. Neuro - grossly intact.

## 2020-11-04 NOTE — CONSULT NOTE ADULT - PROBLEM SELECTOR RECOMMENDATION 6
Health Call  Patient Name: Chanda Jones  Gender: Female  : 1925  Age: 80 Y 5 M 4 D  Return Phone  Number: [phone] (Cell)  Address: [address] [facility]/Zip: [address]  Practice Name: [address] [place] [address] Basys  Practice Charged:  Physician:  [address] [facility] Name: Selvin Jimenez  Relationship To  Patient: Son  Return Phone Number: [phone] (Cell)  Presenting Problem: "My mom fell and she is not in any  pain, but she can not bare any weight  right now  What should we do?"  Service Type: Triage  Charged Service 1: N/A  Pharmacy Name and  Number:  Nurse Assessment  Nurse: HEVER Olmstead Eleanora Matin Date/Time: 2019 4:54:35 PM  Type of assessment required:  ---General (Adult or Child)  Duration of Current S/S  ---20 hours  Location/Radiation  ---Right hip  Temperature (F) and route:  ---Denies  Symptom Specific Meds (Dose/Time):  ---None  Other S/S  ---Unwitnessed fall yesterday while ambulating in bedroom  Pt says her "feet went out  from under her " Denies striking head, LOC  Daughter lives in house, able to get pt into  bed with assistance of sibling  Pt now unable to ambulate  Denies pain while resting in  bed,  Pain Scale on scale of 1-10, 10 being the worst:  ---8  Symptom progression:  ---worse  Intake and Output  ---Normal  Protocols  Protocol Title Nurse Date/Time  Hip Injury HEVER Olmstead Eleanora Matin 2019 5:01:14 PM  Question Caller Affirmed  Disp  Time Disposition Final User  2019 5:01:47 PM Call EMS [phone] Now HEVER Olmstead Eleanora Matin  2019 5:01:55 PM RN Triaged Yes HEVER Olmstead, [facility] Advice Given Per Protocol  CALL  NOW: Immediate medical attention is needed  You need to hang up and call 911 (or an ambulance)  (Chyrel Nicely: I'll call you back in a few minutes to be sure you were able to reach them ) CARE ADVICE given per Hip Injury (Adult)  guideline    Caller Understands: Yes  Caller Disagree/Comply: Comply  PreDisposition: Unsure
Pt takes Nexium as an outpateint.  He is very prone to GERD given lax LES. WIll need to continue this going forward.

## 2020-11-04 NOTE — ED PROVIDER NOTE - INTERNATIONAL TRAVEL
BACK-UP ORGAN OFFER NOTE    Notified by Ryan Sr, , of backup liver offer with donor information.  Donor and recipient information read back and verified.  Spoke with Zeina Mahoney and identified no acute medical issues in telephone assessment.  Patient instructed we do not have any times. She can still eat and drink at this time. Pt has 2pm clinic appt and will obtain her Covid test at that time.  . Patient verbalized understanding that she has been called as a backup.  on phone with patient,.   
No

## 2020-11-04 NOTE — H&P ADULT - NSHPPHYSICALEXAM_GEN_ALL_CORE
Physical exam with a middle aged, chronically ill appearing F with diffuse sarcopenia.    Afebrile.  HR  65   RR 14  BP  150/70   98% on RA    HEENT< PERRL< EOMI, bitemporal wasting, oropharynx clear  Neck supple, nontender, no appreciable adenopathy  No appreciable thyromegaly  Chest clear, bony rib cage  Cor s1 s2  Declined breast exam.  Abdomen soft, scaphoid, normal bowel sounds.  Skin dry  Ext diffuse sclerodactyly.  Neurovascular intact.  NO hyperemia or cyanosis.  Neurologic AXOx3.  Speech fluent.  Cognition intact.  UE/LE 5/5.  NO SI/HI.

## 2020-11-04 NOTE — CONSULT NOTE ADULT - ATTENDING COMMENTS
Differential diagnosis and plan of care discussed with patient after the evaluation  125 Minutes spent on total encounter of which more than fifty percent of the encounter was spent counseling and/or coordinating care by the attending physician.  Advanced care planning was discussed with the patient and/or surrogate decision makers. Advanced care planning forms were discussed. The risks benefits and alternatives to pertinent gastrointestinal procedures and interventions were discussed in detail and all questions were answered. Duration: 30 Minutes.      Orestes Harris M.D.   Gastroenterology and Hepatology  Cell: [phone]

## 2020-11-05 ENCOUNTER — TRANSCRIPTION ENCOUNTER (OUTPATIENT)
Age: 65
End: 2020-11-05

## 2020-11-05 LAB
ANION GAP SERPL CALC-SCNC: 13 MMOL/L — SIGNIFICANT CHANGE UP (ref 5–17)
BASOPHILS # BLD AUTO: 0.03 K/UL — SIGNIFICANT CHANGE UP (ref 0–0.2)
BASOPHILS NFR BLD AUTO: 0.7 % — SIGNIFICANT CHANGE UP (ref 0–2)
BUN SERPL-MCNC: 12 MG/DL — SIGNIFICANT CHANGE UP (ref 7–23)
CALCIUM SERPL-MCNC: 9.6 MG/DL — SIGNIFICANT CHANGE UP (ref 8.4–10.5)
CHLORIDE SERPL-SCNC: 99 MMOL/L — SIGNIFICANT CHANGE UP (ref 96–108)
CO2 SERPL-SCNC: 26 MMOL/L — SIGNIFICANT CHANGE UP (ref 22–31)
CREAT SERPL-MCNC: 0.72 MG/DL — SIGNIFICANT CHANGE UP (ref 0.5–1.3)
EOSINOPHIL # BLD AUTO: 0.18 K/UL — SIGNIFICANT CHANGE UP (ref 0–0.5)
EOSINOPHIL NFR BLD AUTO: 4 % — SIGNIFICANT CHANGE UP (ref 0–6)
GLUCOSE BLDC GLUCOMTR-MCNC: 140 MG/DL — HIGH (ref 70–99)
GLUCOSE BLDC GLUCOMTR-MCNC: 73 MG/DL — SIGNIFICANT CHANGE UP (ref 70–99)
GLUCOSE BLDC GLUCOMTR-MCNC: 80 MG/DL — SIGNIFICANT CHANGE UP (ref 70–99)
GLUCOSE BLDC GLUCOMTR-MCNC: 82 MG/DL — SIGNIFICANT CHANGE UP (ref 70–99)
GLUCOSE SERPL-MCNC: 75 MG/DL — SIGNIFICANT CHANGE UP (ref 70–99)
HCT VFR BLD CALC: 35.4 % — SIGNIFICANT CHANGE UP (ref 34.5–45)
HCV AB S/CO SERPL IA: 0.09 S/CO — SIGNIFICANT CHANGE UP (ref 0–0.99)
HCV AB SERPL-IMP: SIGNIFICANT CHANGE UP
HGB BLD-MCNC: 10.8 G/DL — LOW (ref 11.5–15.5)
IMM GRANULOCYTES NFR BLD AUTO: 0.4 % — SIGNIFICANT CHANGE UP (ref 0–1.5)
LYMPHOCYTES # BLD AUTO: 1.08 K/UL — SIGNIFICANT CHANGE UP (ref 1–3.3)
LYMPHOCYTES # BLD AUTO: 24 % — SIGNIFICANT CHANGE UP (ref 13–44)
MCHC RBC-ENTMCNC: 25.7 PG — LOW (ref 27–34)
MCHC RBC-ENTMCNC: 30.5 GM/DL — LOW (ref 32–36)
MCV RBC AUTO: 84.3 FL — SIGNIFICANT CHANGE UP (ref 80–100)
MONOCYTES # BLD AUTO: 0.39 K/UL — SIGNIFICANT CHANGE UP (ref 0–0.9)
MONOCYTES NFR BLD AUTO: 8.7 % — SIGNIFICANT CHANGE UP (ref 2–14)
NEUTROPHILS # BLD AUTO: 2.8 K/UL — SIGNIFICANT CHANGE UP (ref 1.8–7.4)
NEUTROPHILS NFR BLD AUTO: 62.2 % — SIGNIFICANT CHANGE UP (ref 43–77)
NRBC # BLD: 0 /100 WBCS — SIGNIFICANT CHANGE UP (ref 0–0)
PLATELET # BLD AUTO: 175 K/UL — SIGNIFICANT CHANGE UP (ref 150–400)
POTASSIUM SERPL-MCNC: 3.6 MMOL/L — SIGNIFICANT CHANGE UP (ref 3.5–5.3)
POTASSIUM SERPL-SCNC: 3.6 MMOL/L — SIGNIFICANT CHANGE UP (ref 3.5–5.3)
RBC # BLD: 4.2 M/UL — SIGNIFICANT CHANGE UP (ref 3.8–5.2)
RBC # FLD: 13.3 % — SIGNIFICANT CHANGE UP (ref 10.3–14.5)
SARS-COV-2 IGG SERPL QL IA: NEGATIVE — SIGNIFICANT CHANGE UP
SARS-COV-2 IGM SERPL IA-ACNC: 0.09 INDEX — SIGNIFICANT CHANGE UP
SODIUM SERPL-SCNC: 138 MMOL/L — SIGNIFICANT CHANGE UP (ref 135–145)
T3 SERPL-MCNC: 121 NG/DL — SIGNIFICANT CHANGE UP (ref 80–200)
T4 AB SER-ACNC: 9.5 UG/DL — SIGNIFICANT CHANGE UP (ref 4.6–12)
TSH SERPL-MCNC: 0.45 UIU/ML — SIGNIFICANT CHANGE UP (ref 0.27–4.2)
WBC # BLD: 4.5 K/UL — SIGNIFICANT CHANGE UP (ref 3.8–10.5)
WBC # FLD AUTO: 4.5 K/UL — SIGNIFICANT CHANGE UP (ref 3.8–10.5)

## 2020-11-05 PROCEDURE — 76536 US EXAM OF HEAD AND NECK: CPT | Mod: 26

## 2020-11-05 RX ORDER — BOSENTAN 125 MG/1
125 TABLET, FILM COATED ORAL
Refills: 0 | Status: DISCONTINUED | OUTPATIENT
Start: 2020-11-05 | End: 2020-11-06

## 2020-11-05 RX ORDER — LAMOTRIGINE 25 MG/1
1 TABLET, ORALLY DISINTEGRATING ORAL
Qty: 0 | Refills: 0 | DISCHARGE

## 2020-11-05 RX ORDER — PANTOPRAZOLE SODIUM 20 MG/1
40 TABLET, DELAYED RELEASE ORAL
Refills: 0 | Status: DISCONTINUED | OUTPATIENT
Start: 2020-11-05 | End: 2020-11-06

## 2020-11-05 RX ORDER — CYCLOBENZAPRINE HYDROCHLORIDE 10 MG/1
1 TABLET, FILM COATED ORAL
Qty: 0 | Refills: 0 | DISCHARGE

## 2020-11-05 RX ADMIN — MYCOPHENOLATE MOFETIL 1000 MILLIGRAM(S): 250 CAPSULE ORAL at 21:18

## 2020-11-05 RX ADMIN — Medication 50 MILLIGRAM(S): at 05:00

## 2020-11-05 RX ADMIN — PANTOPRAZOLE SODIUM 40 MILLIGRAM(S): 20 TABLET, DELAYED RELEASE ORAL at 21:19

## 2020-11-05 RX ADMIN — PANTOPRAZOLE SODIUM 40 MILLIGRAM(S): 20 TABLET, DELAYED RELEASE ORAL at 14:45

## 2020-11-05 RX ADMIN — PANTOPRAZOLE SODIUM 40 MILLIGRAM(S): 20 TABLET, DELAYED RELEASE ORAL at 05:01

## 2020-11-05 RX ADMIN — Medication 2 MILLIGRAM(S): at 21:18

## 2020-11-05 RX ADMIN — Medication 50 MILLIGRAM(S): at 16:54

## 2020-11-05 RX ADMIN — BOSENTAN 125 MILLIGRAM(S): 125 TABLET, FILM COATED ORAL at 17:52

## 2020-11-05 RX ADMIN — MYCOPHENOLATE MOFETIL 1500 MILLIGRAM(S): 250 CAPSULE ORAL at 05:00

## 2020-11-05 RX ADMIN — Medication 60 MILLIGRAM(S): at 05:00

## 2020-11-05 RX ADMIN — LAMOTRIGINE 200 MILLIGRAM(S): 25 TABLET, ORALLY DISINTEGRATING ORAL at 05:00

## 2020-11-05 NOTE — DISCHARGE NOTE PROVIDER - HOSPITAL COURSE
03/17/20 1240   Maternal Assessment   Breast Shape Bilateral:;round   Breast Density Bilateral:;soft   Areola Bilateral:;elastic   Nipples Bilateral:;flat   Maternal Infant Feeding   Maternal Emotional State relaxed   Infant Positioning cross-cradle   Pt called for assistance with feeding. LC encouraged Pt to utilize state modulation to facilitate baby to shift from sleep to quiet alert. Baby's lips remained pursed and eyes partially closed. Pt agreeable to hand expression.  LC able to express 1 ml and provided to baby. Pt questioned the volume needed for baby's contentment.  utilized the Breastfeeding Guide to identify volume expectations for baby's first 24 hours.   encouraged Pt to attempt to nurse baby, if no latch achieved, Pt to hand express and provide to baby. If baby continues to demonstrate signs of hunger, Pt to supplement with formula by cup or spoon. Pt acknowledged understanding and will contact  for assistance with next feeding.   
   03/19/20 0930   Maternal Infant Feeding   Latch Assistance no   Breast Pumping   Breast Pumping Interventions post-feed pumping encouraged;frequent pumping encouraged;early pumping promoted   Breast Pumping other (see comments)  (to pump after feeds)   Lactation Referrals   Lactation Referrals other (see comments)  (lactation warmline)   Discharge lactation education reviewed with breastfeeding guide. Baby recently nursed for 10min at 0830 then had formula after. Encouraged to call for latch check before discharge. REviewed discharge plan to nurse, pump, supplement with EBM, formula if needed. Pt to call for pump options. Pt has manual hand pump. Pt has lactation warmline number for support.   
"   03/18/20 1217   Maternal Assessment   Breast Shape Bilateral:;round   Breast Density Bilateral:;soft   Areola Bilateral:;elastic   Nipples Bilateral:;flat   Maternal Infant Feeding   Maternal Emotional State relaxed   Infant Positioning clutch/football   Latch Assistance yes   Pt agreeable to receiving assistance with latching. LC performed suck training prior to latching baby. Baby sucked briefly on finger. LC placed baby to breast. Baby licked nipple; however, baby did not attempt to nurse. Baby placed skin to skin. Rooting observed; however, when baby placed to breast lips remained pursed and drowsy. LC offered to assist with hand expression. LC expressed 4ml and FOB provide to baby by spoon and gloved finger.  LC encouraged Pt to supplement with formula if baby demonstrates hunger cues. LC reminded Pt to continue to wake baby for feeds. Pt comfortable with pumping briefly to pull nipple out prior to latching baby at next feed and attempt to latch baby. Pt stated, " Yea, she latched for about ten minutes last night after I pumped." POC: Pump briefly to draw out nipples; attempt to latch baby for no more than 10 minutes; If no latch Pt to pump while FOB supplements with formula or ebm.    "
Lactation Basics education completed. LC reviewed Breastfeeding Guide and encouraged tracking feeds and output. Encouraged use of STS, frequent feeds based on baby's cues(aware that Pt may need to wake baby q three hours), and avoiding artificial nipples. Pt reports spoon feeding baby formula, but is interested in latching baby. LC provided baby breast shells to assist with everting nipples.  provided Pt education on use and maintenance of breast shells. Pt verbalized understanding and questions answered. Pt aware to call LC for assistance with feeding.   
64 year old female w hx of scleroderma esophagus enquiring multiple dilationt presenting with progressive dysphagia    11/5: s/p EGD: Esophageal stenosis status post dilation to 12 mm.                       - c/w PPI BID.                       - Pt terrie meals----------------------------------                       - Repeat endoscopy with Dr. Lucio for further dilation in 2-4 weeks.

## 2020-11-05 NOTE — DISCHARGE NOTE PROVIDER - NSDCCPCAREPLAN_GEN_ALL_CORE_FT
PRINCIPAL DISCHARGE DIAGNOSIS  Diagnosis: Esophageal stenosis  Assessment and Plan of Treatment: Had upper endoscopy on 11/5 with Dilation      SECONDARY DISCHARGE DIAGNOSES  Diagnosis: GERD (gastroesophageal reflux disease)  Assessment and Plan of Treatment: Continue with Nexium    Diagnosis: Scleroderma  Assessment and Plan of Treatment: Continue with Cellcept. Follow up with your Rheumatologist is Dr. Goldberg.     PRINCIPAL DISCHARGE DIAGNOSIS  Diagnosis: Esophageal stenosis  Assessment and Plan of Treatment: Had upper endoscopy on 11/5 with Dilation.  Follow up with Gastroenterologist in 2 weeks - Keep appointment already scheduled.      SECONDARY DISCHARGE DIAGNOSES  Diagnosis: Thyroid nodule  Assessment and Plan of Treatment: Nodule is small in size and not growing.  Follow up with your PMD for monitoring and imaging in 1 year per Endocrinologist.    Diagnosis: GERD (gastroesophageal reflux disease)  Assessment and Plan of Treatment: Continue with Nexium.  Follow up with Gastroenterologist in 2 weeks    Diagnosis: Scleroderma  Assessment and Plan of Treatment: Continue with Cellcept. Follow up with your Rheumatologist is Dr. Goldberg.

## 2020-11-05 NOTE — DISCHARGE NOTE PROVIDER - PROVIDER TOKENS
PROVIDER:[TOKEN:[[id number]]],PROVIDER:[TOKEN:[[id number]]] PROVIDER:[TOKEN:[[id number]],FOLLOWUP:[1 week]],PROVIDER:[TOKEN:[[id number]],FOLLOWUP:[1 week]]

## 2020-11-05 NOTE — PROGRESS NOTE ADULT - ASSESSMENT
Problem/Plan - 1:  ·  Problem: Esophageal stricture.  Plan: See above.  discussed with Dr. Harris :  s/p EGD and dilatation   planned repeat dilatation in few weeks as op  liquid diet for now and advance in AM if tolerating   likely dc in 24 hours   avoid NSAIDS for now   ppi     Problem/Plan - 2:  ·  Problem: Scleroderma.  Plan: cont meds     Problem/Plan - 3:  ·  Problem: Pulmonary hypertension.  Plan: cont op meds     Problem/Plan - 4:  ·  Problem: Essential hypertension.  Plan: On prazosin, metoprolol, nifedipine.        Problem/Plan - 5:  ·  Problem: Seizure disorder.  Plan: On Lamictal as above.     Problem/Plan - 6:  Problem: Thyroid nodule. Plan: By history, undifferentiated, TFT, thyroid US.    Problem/Plan - 7:  ·  Problem: Need for prophylactic measure.  Plan: Patient agrees to pharmacologic DVT prophylaxis.

## 2020-11-05 NOTE — DISCHARGE NOTE PROVIDER - CARE PROVIDERS DIRECT ADDRESSES
,[email or web address]

## 2020-11-05 NOTE — PROGRESS NOTE ADULT - ASSESSMENT
64 year old female w hx of scleroderma esophagus enquiring multiple dilationt presenting with progressive dysphagia     Problem/Recommendation - 1:  Problem: Dysphagia. Recommendation: Suspect this is due to progression of her stricture. There does not seem to be food impacted based on the lack of foreign body sensation and pt appears in not distress, tolerating her secreations.  -plan for EGD today with possible dilation of the stricture.     Problem/Recommendation - 2:  ·  Problem: Peptic stricture of esophagus.  Recommendation: As above pt has peptic stricture. She will likely need dilation.      Problem/Recommendation - 3:  ·  Problem: Unintentional weight loss.  Recommendation: Likely due to dysphagia. Plan for EGD tomorrow to assess the situation and hopefully provide therapeutic dilation.   -nutrition consult.      Problem/Recommendation - 4:  ·  Problem: Scleroderma.  Recommendation: on cellcept. Rheumatologist is Dr. Goldberg.      Problem/Recommendation - 5:  ·  Problem: Essential hypertension.  Recommendation: per medicine.      Problem/Recommendation - 6:  Problem: GERD (gastroesophageal reflux disease). Recommendation: Pt takes Nexium as an outpateint.  She is very prone to GERD given lax LES. WIll need to continue this going forward.     Problem/Recommendation - 7:  Problem: Seizure disorder. Recommendation: per medicine.     Problem/Recommendation - 8:  Problem: Raynaud's disease without gangrene.    Attending Attestation:   Differential diagnosis and plan of care discussed with patient after the evaluation  75 Minutes spent on total encounter of which more than fifty percent of the encounter was spent counseling and/or coordinating care by the attending physician.        Orestes Harris M.D.

## 2020-11-05 NOTE — PACU DISCHARGE NOTE - AIRWAY PATENCY:
Satisfactory
If you are a smoker, it is important for your health to stop smoking. Please be aware that second hand smoke is also harmful.

## 2020-11-05 NOTE — DISCHARGE NOTE PROVIDER - NSDCFUADDAPPT_GEN_ALL_CORE_FT
Follow up with Gastroenterology for repeat endoscopy with Dr. Lucio for further dilation in 2-4 weeks.   Follow up with Gastroenterology for repeat endoscopy with Dr. Lucio for further dilation in 2-4 weeks.  CALL ON MONDAY TO SCHEDULE APPOINTMENT IN 1 WEEK.

## 2020-11-05 NOTE — CHART NOTE - NSCHARTNOTEFT_GEN_A_CORE
Episodic Note     Notified by RN that patient refusing to Doxazosin and Sildenafil. Per Patient she normally takes Prazosin at home and wants to take her own Prazosin, but would have to have her family to bring in the medication in AM from home. Per pharmacy Prazosin is not carried by pharmacy, and Doxazosin is the therapeutic interchange. Also patient, states she only takes the sildenafil when she is having a Scleroderma flare in her hands.    > Patient A&O x4, thus with right to refuse medication.   > Explained the risks to the patient of not taking above medications. Patient educated that the Doxazosin is the therapeutic interchange for Prazosin, and will aid in treating her hypertension. Patient also educated that the Sildaenfil is also helpful in aiding in her Scleroderma and Pulmonary HTN. Patient still refusing the two medications, after the risks and benefits were explained.   > Dr. Daley, who completed the H&P aware that patient refusing Doxazosin and Sildenafil.  > Encouraged patient to have family bring in the medications in AM and immediately to give the medications to the RN/provider at that time so they can be sent to pharmacy and verified.   > Will endorse to covering day Medicine provider to f/u on patient's medications and make the appropriate changes, as well as will endorse to covering day Medicine provider to f/u in AM with Dr. Riggs regarding this.   > Continue to monitor patient and vitals closely.   > F/u with primary care team in AM.     RN aware of above management plan. Dr. Daley aware of the above.     Chelsea Choudhury PA-C  Department of Medicine   #[id number] Episodic Note     [facility]  services used,  name Mina, ID# [id number]     Notified by RN that patient refusing to Doxazosin and Sildenafil. Per Patient she normally takes Prazosin at home and wants to take her own Prazosin, but would have to have her family to bring in the medication in AM from home. Per pharmacy Prazosin is not carried by pharmacy, and Doxazosin is the therapeutic interchange. Also patient, states she only takes the sildenafil when she is having a Scleroderma flare in her hands.    > Patient A&O x4, thus with right to refuse medication.   > Explained the risks to the patient of not taking above medications. Patient educated that the Doxazosin is the therapeutic interchange for Prazosin, and will aid in treating her hypertension. Patient also educated that the Sildaenfil is also helpful in aiding in her Scleroderma and Pulmonary HTN. Patient still refusing the two medications, after the risks and benefits were explained.   > Dr. Daley, who completed the H&P aware that patient refusing Doxazosin and Sildenafil.  > Encouraged patient to have family bring in the medications in AM and immediately to give the medications to the RN/provider at that time so they can be sent to pharmacy and verified.   > Will endorse to covering day Medicine provider to f/u on patient's medications and make the appropriate changes, as well as will endorse to covering day Medicine provider to f/u in AM with Dr. Riggs regarding this.   > Continue to monitor patient and vitals closely.   > F/u with primary care team in AM.     RN aware of above management plan. Dr. Daley aware of the above.     Chelsea Choudhury PA-C  Department of Medicine   #[id number]

## 2020-11-05 NOTE — PROGRESS NOTE ADULT - SUBJECTIVE AND OBJECTIVE BOX
Patient is a 64y old  Female who presents with a chief complaint of Difficulty with swallowing with solids for the past 10 days (05 Nov 2020 12:20)                                                               INTERVAL HPI/OVERNIGHT EVENTS:    REVIEW OF SYSTEMS:     CONSTITUTIONAL: No weakness, fevers or chills  RESPIRATORY: No cough, wheezing,  No shortness of breath  CARDIOVASCULAR: No chest pain or palpitations  GASTROINTESTINAL: No abdominal pain  . No nausea, vomiting, or hematemesis; No diarrhea or constipation. No melena or hematochezia.  GENITOURINARY: No dysuria, frequency or hematuria  NEUROLOGICAL: No numbness or weakness  sore throat                                                                                                                                                                                                                                                                                  Medications:  MEDICATIONS  (STANDING):  bosentan 125 milliGRAM(s) Oral two times a day  dextrose 5%. 1000 milliLiter(s) (50 mL/Hr) IV Continuous <Continuous>  dextrose 50% Injectable 12.5 Gram(s) IV Push once  dextrose 50% Injectable 25 Gram(s) IV Push once  dextrose 50% Injectable 25 Gram(s) IV Push once  doxazosin 2 milliGRAM(s) Oral at bedtime  heparin   Injectable 5000 Unit(s) SubCutaneous every 12 hours  influenza   Vaccine 0.5 milliLiter(s) IntraMuscular once  lamoTRIgine 200 milliGRAM(s) Oral two times a day  metoprolol tartrate 50 milliGRAM(s) Oral two times a day  mycophenolate mofetil 1500 milliGRAM(s) Oral <User Schedule>  mycophenolate mofetil 1000 milliGRAM(s) Oral at bedtime  NIFEdipine XL 60 milliGRAM(s) Oral daily  pantoprazole  Injectable 40 milliGRAM(s) IV Push two times a day  sildenafil (REVATIO) 20 milliGRAM(s) Oral three times a day    MEDICATIONS  (PRN):  dextrose 40% Gel 15 Gram(s) Oral once PRN Blood Glucose LESS THAN 70 milliGRAM(s)/deciliter  glucagon  Injectable 1 milliGRAM(s) IntraMuscular once PRN Glucose LESS THAN 70 milligrams/deciliter       Allergies    No Known Allergies    Intolerances      Vital Signs Last 24 Hrs  T(C): 36.4 (05 Nov 2020 19:36), Max: 36.7 (05 Nov 2020 09:55)  T(F): 97.5 (05 Nov 2020 19:36), Max: 98.1 (05 Nov 2020 09:55)  HR: 62 (05 Nov 2020 19:36) (52 - 70)  BP: 116/69 (05 Nov 2020 19:36) (114/66 - 158/75)  BP(mean): --  RR: 20 (05 Nov 2020 19:36) (16 - 21)  SpO2: 98% (05 Nov 2020 19:36) (97% - 99%)  CAPILLARY BLOOD GLUCOSE      POCT Blood Glucose.: 140 mg/dL (05 Nov 2020 17:04)  POCT Blood Glucose.: 73 mg/dL (05 Nov 2020 08:18)  POCT Blood Glucose.: 80 mg/dL (05 Nov 2020 06:18)      11-04 @ 07:01  -  11-05 @ 07:00  --------------------------------------------------------  IN: 200 mL / OUT: 0 mL / NET: 200 mL    11-05 @ 07:01 - 11-05 @ 21:53  --------------------------------------------------------  IN: 240 mL / OUT: 0 mL / NET: 240 mL      Physical Exam:    Daily Height in cm: 157.48 (05 Nov 2020 10:06)    Daily   General:  NAD   HEENT:  Nonicteric, PERRLA  CV:  RRR, S1S2   Lungs:  CTA  Abdomen:  Soft, non-tender, no distended, positive BS  Extremities:  no edema   Neuro:  AAOx3, non-focal, grossly intact                                                                                                                                                                                                                                                                                                LABS:                               10.8   4.50  )-----------( 175      ( 05 Nov 2020 07:23 )             35.4                      11-05    138  |  99  |  12  ----------------------------<  75  3.6   |  26  |  0.72    Ca    9.6      05 Nov 2020 07:25    TPro  7.4  /  Alb  4.6  /  TBili  0.2  /  DBili  x   /  AST  13  /  ALT  6<L>  /  AlkPhos  62  11-04                       RADIOLOGY & ADDITIONAL TESTS         I personally reviewed: [  ]EKG   [  ]CXR    [  ] CT      A/P:         Discussed with :     [facility] consultants' Notes   Time spent :

## 2020-11-05 NOTE — DISCHARGE NOTE PROVIDER - NSDCMRMEDTOKEN_GEN_ALL_CORE_FT
aspirin 81 mg oral delayed release tablet: 1 tab(s) orally 2 times a day  bosentan 125 mg oral tablet: 1 tab(s) orally 2 times a day  calcium (as calcium citrate) 250 mg oral tablet: 1 tab(s) orally 2 times a day  CellCept 500 mg oral tablet: 3 tab(s) orally once a day in the morning  CellCept 500 mg oral tablet: 2 tab(s) orally once a day (at bedtime)  esomeprazole 40 mg oral delayed release capsule: 1 cap(s) orally 2 times a day  ferrous gluconate 324 mg (38 mg elemental iron) oral tablet: 1 tab(s) orally 2 times a day  LaMICtal 200 mg oral tablet: 1 tab(s) orally 2 times a day  Metoprolol Tartrate 50 mg oral tablet: 1 tab(s) orally 2 times a day  naproxen 500 mg oral tablet: 1 tab(s) orally 2 times a day  NIFEdipine 60 mg oral tablet, extended release: 1 tab(s) orally once a day  prazosin 1 mg oral capsule: 2 cap(s) orally once a day in the evening  sildenafil 20 mg oral tablet: 1 tab(s) orally 3 times a day  Vitamin D3 2000 intl units (50 mcg) oral tablet: 1 tab(s) orally once a day   aspirin 81 mg oral delayed release tablet: 1 tab(s) orally 2 times a day  bosentan 125 mg oral tablet: 1 tab(s) orally 2 times a day  calcium (as calcium citrate) 250 mg oral tablet: 1 tab(s) orally 2 times a day  CellCept 500 mg oral tablet: 3 tab(s) orally once a day in the morning  CellCept 500 mg oral tablet: 2 tab(s) orally once a day (at bedtime)  esomeprazole 40 mg oral delayed release capsule: 1 cap(s) orally 2 times a day  ferrous gluconate 324 mg (38 mg elemental iron) oral tablet: 1 tab(s) orally 2 times a day  LaMICtal 200 mg oral tablet: 1 tab(s) orally 2 times a day  menthol-benzocaine 3.6 mg-15 mg mucous membrane lozenge: 1 lozenge mucous membrane every 6 hours, As Needed for sore throat  Metoprolol Tartrate 50 mg oral tablet: 1 tab(s) orally 2 times a day  naproxen 500 mg oral tablet: 1 tab(s) orally 2 times a day, As Needed for pain.  NIFEdipine 60 mg oral tablet, extended release: 1 tab(s) orally once a day  prazosin 1 mg oral capsule: 2 cap(s) orally once a day in the evening  sildenafil 20 mg oral tablet: 1 tab(s) orally 3 times a day

## 2020-11-05 NOTE — DISCHARGE NOTE PROVIDER - CARE PROVIDER_API CALL
Remi Lucio  GASTROENTEROLOGY  [address] [place] [address], [address]  [place] [address]  Phone: [phone]  Fax: [phone]  Follow Up Time:     Goldberg, Avram Z  INTERNAL MEDICINE  [address] [facility], [address]  [place] [address]  Phone: [phone]  Fax: [phone]  Follow Up Time:    Remi Lucio  GASTROENTEROLOGY  [address] [place] [address], [address]  [place] [address]  Phone: [phone]  Fax: [phone]  Follow Up Time: 1 week    Goldberg, Avram Z  INTERNAL MEDICINE  [address] [place] [address], [address]  [place] [address]  Phone: [phone]  Fax: [phone]  Follow Up Time: 1 week

## 2020-11-05 NOTE — PROGRESS NOTE ADULT - SUBJECTIVE AND OBJECTIVE BOX
Chief Complaint:  Patient is a 64y old  Female who presents with a chief complaint of Difficulty with swallowing with solids for the past 10 days (04 Nov 2020 22:43)      Interval Events:   no acute events  Allergies:  No Known Allergies      Hospital Medications:  dextrose 40% Gel 15 Gram(s) Oral once PRN  dextrose 5%. 1000 milliLiter(s) IV Continuous <Continuous>  dextrose 50% Injectable 12.5 Gram(s) IV Push once  dextrose 50% Injectable 25 Gram(s) IV Push once  dextrose 50% Injectable 25 Gram(s) IV Push once  doxazosin 2 milliGRAM(s) Oral at bedtime  glucagon  Injectable 1 milliGRAM(s) IntraMuscular once PRN  heparin   Injectable 5000 Unit(s) SubCutaneous every 12 hours  influenza   Vaccine 0.5 milliLiter(s) IntraMuscular once  lamoTRIgine 200 milliGRAM(s) Oral two times a day  metoprolol tartrate 50 milliGRAM(s) Oral two times a day  mycophenolate mofetil 1500 milliGRAM(s) Oral <User Schedule>  mycophenolate mofetil 1000 milliGRAM(s) Oral at bedtime  NIFEdipine XL 60 milliGRAM(s) Oral daily  pantoprazole    Tablet 40 milliGRAM(s) Oral before breakfast  sildenafil (REVATIO) 20 milliGRAM(s) Oral three times a day      PMHX/PSHX:  Digital blood vessel injury    Dysphagia, unspecified type    Seizure    Raynaud&#x27;s disease without gangrene    Mitral regurgitation    Heart murmur    GERD (gastroesophageal reflux disease)    Epilepsy    Pulmonary hypertension    Arthritis    Arrhythmia    Scleroderma    No significant past surgical history        Family history:  No pertinent family history in first degree relatives    No pertinent family history in first degree relatives        ROS:     General:  No wt loss, fevers, chills, night sweats, fatigue,   Eyes:  Good vision, no reported pain  ENT:  No sore throat, pain, runny nose, dysphagia  CV:  No pain, palpitations, hypo/hypertension  Resp:  No dyspnea, cough, tachypnea, wheezing  GI:  See HPI  :  No pain, bleeding, incontinence, nocturia  Muscle:  No pain, weakness  Neuro:  No weakness, tingling, memory problems  Psych:  No fatigue, insomnia, mood problems, depression  Endocrine:  No polyuria, polydipsia, cold/heat intolerance  Heme:  No petechiae, ecchymosis, easy bruisability  Skin:  No rash, edema      PHYSICAL EXAM:     GENERAL:  Appears stated age, well-groomed, thin, no distress  HEENT:  NC/AT,  conjunctivae clear, sclera-anicteric  NECK: Trachea midline, supple  CHEST:  Full & symmetric excursion, no increased effort, breath sounds clear  HEART:  Regular rhythm, no jeyson/heave  ABDOMEN:  Soft, non-tender, non-distended, normoactive bowel sounds,  no masses ,no hepato-splenomegaly,   EXTREMITIES:  no cyanosis,clubbing or edema  SKIN:  No rash/erythema/petechiae, no jaundice  NEURO:  Alert, oriented, no asterixis  RECTAL: Deferred    Vital Signs:  Vital Signs Last 24 Hrs  T(C): 36.7 (05 Nov 2020 09:55), Max: 36.7 (04 Nov 2020 16:30)  T(F): 98.1 (05 Nov 2020 09:55), Max: 98.1 (04 Nov 2020 19:35)  HR: 59 (05 Nov 2020 09:55) (55 - 69)  BP: 114/66 (05 Nov 2020 08:45) (114/66 - 164/62)  BP(mean): --  RR: 20 (05 Nov 2020 09:55) (16 - 20)  SpO2: 99% (05 Nov 2020 09:55) (97% - 99%)  Daily Height in cm: 157.48 (05 Nov 2020 08:45)    Daily     LABS:                        10.8   4.50  )-----------( 175      ( 05 Nov 2020 07:23 )             35.4     11-05    138  |  99  |  12  ----------------------------<  75  3.6   |  26  |  0.72    Ca    9.6      05 Nov 2020 07:25    TPro  7.4  /  Alb  4.6  /  TBili  0.2  /  DBili  x   /  AST  13  /  ALT  6<L>  /  AlkPhos  62  11-04    LIVER FUNCTIONS - ( 04 Nov 2020 13:50 )  Alb: 4.6 g/dL / Pro: 7.4 g/dL / ALK PHOS: 62 U/L / ALT: 6 U/L / AST: 13 U/L / GGT: x           PT/INR - ( 04 Nov 2020 16:31 )   PT: 12.6 sec;   INR: 1.05 ratio         PTT - ( 04 Nov 2020 16:31 )  PTT:30.8 sec    Amylase Serum--      Lipase serum42       Ammonia--      Imaging:

## 2020-11-06 ENCOUNTER — TRANSCRIPTION ENCOUNTER (OUTPATIENT)
Age: 65
End: 2020-11-06

## 2020-11-06 VITALS
TEMPERATURE: 98 F | SYSTOLIC BLOOD PRESSURE: 106 MMHG | RESPIRATION RATE: 18 BRPM | OXYGEN SATURATION: 98 % | DIASTOLIC BLOOD PRESSURE: 58 MMHG | HEART RATE: 72 BPM

## 2020-11-06 DIAGNOSIS — K22.2 ESOPHAGEAL OBSTRUCTION: ICD-10-CM

## 2020-11-06 LAB
ANION GAP SERPL CALC-SCNC: 9 MMOL/L — SIGNIFICANT CHANGE UP (ref 5–17)
BUN SERPL-MCNC: 14 MG/DL — SIGNIFICANT CHANGE UP (ref 7–23)
CALCIUM SERPL-MCNC: 9.5 MG/DL — SIGNIFICANT CHANGE UP (ref 8.4–10.5)
CHLORIDE SERPL-SCNC: 100 MMOL/L — SIGNIFICANT CHANGE UP (ref 96–108)
CO2 SERPL-SCNC: 28 MMOL/L — SIGNIFICANT CHANGE UP (ref 22–31)
CREAT SERPL-MCNC: 0.82 MG/DL — SIGNIFICANT CHANGE UP (ref 0.5–1.3)
GLUCOSE BLDC GLUCOMTR-MCNC: 120 MG/DL — HIGH (ref 70–99)
GLUCOSE BLDC GLUCOMTR-MCNC: 96 MG/DL — SIGNIFICANT CHANGE UP (ref 70–99)
GLUCOSE SERPL-MCNC: 91 MG/DL — SIGNIFICANT CHANGE UP (ref 70–99)
HCT VFR BLD CALC: 33 % — LOW (ref 34.5–45)
HGB BLD-MCNC: 10.2 G/DL — LOW (ref 11.5–15.5)
MCHC RBC-ENTMCNC: 25.8 PG — LOW (ref 27–34)
MCHC RBC-ENTMCNC: 30.9 GM/DL — LOW (ref 32–36)
MCV RBC AUTO: 83.3 FL — SIGNIFICANT CHANGE UP (ref 80–100)
NRBC # BLD: 0 /100 WBCS — SIGNIFICANT CHANGE UP (ref 0–0)
PLATELET # BLD AUTO: 165 K/UL — SIGNIFICANT CHANGE UP (ref 150–400)
POTASSIUM SERPL-MCNC: 3.9 MMOL/L — SIGNIFICANT CHANGE UP (ref 3.5–5.3)
POTASSIUM SERPL-SCNC: 3.9 MMOL/L — SIGNIFICANT CHANGE UP (ref 3.5–5.3)
RBC # BLD: 3.96 M/UL — SIGNIFICANT CHANGE UP (ref 3.8–5.2)
RBC # FLD: 13.2 % — SIGNIFICANT CHANGE UP (ref 10.3–14.5)
SODIUM SERPL-SCNC: 137 MMOL/L — SIGNIFICANT CHANGE UP (ref 135–145)
WBC # BLD: 3.95 K/UL — SIGNIFICANT CHANGE UP (ref 3.8–10.5)
WBC # FLD AUTO: 3.95 K/UL — SIGNIFICANT CHANGE UP (ref 3.8–10.5)

## 2020-11-06 PROCEDURE — 85730 THROMBOPLASTIN TIME PARTIAL: CPT

## 2020-11-06 PROCEDURE — 84443 ASSAY THYROID STIM HORMONE: CPT

## 2020-11-06 PROCEDURE — 86769 SARS-COV-2 COVID-19 ANTIBODY: CPT

## 2020-11-06 PROCEDURE — 83690 ASSAY OF LIPASE: CPT

## 2020-11-06 PROCEDURE — 85610 PROTHROMBIN TIME: CPT

## 2020-11-06 PROCEDURE — 84480 ASSAY TRIIODOTHYRONINE (T3): CPT

## 2020-11-06 PROCEDURE — 99285 EMERGENCY DEPT VISIT HI MDM: CPT | Mod: 25

## 2020-11-06 PROCEDURE — 80048 BASIC METABOLIC PNL TOTAL CA: CPT

## 2020-11-06 PROCEDURE — C1726: CPT

## 2020-11-06 PROCEDURE — 82962 GLUCOSE BLOOD TEST: CPT

## 2020-11-06 PROCEDURE — 85027 COMPLETE CBC AUTOMATED: CPT

## 2020-11-06 PROCEDURE — 80053 COMPREHEN METABOLIC PANEL: CPT

## 2020-11-06 PROCEDURE — 85025 COMPLETE CBC W/AUTO DIFF WBC: CPT

## 2020-11-06 PROCEDURE — 71045 X-RAY EXAM CHEST 1 VIEW: CPT

## 2020-11-06 PROCEDURE — 84436 ASSAY OF TOTAL THYROXINE: CPT

## 2020-11-06 PROCEDURE — U0003: CPT

## 2020-11-06 PROCEDURE — 86803 HEPATITIS C AB TEST: CPT

## 2020-11-06 PROCEDURE — 76536 US EXAM OF HEAD AND NECK: CPT

## 2020-11-06 RX ORDER — BENZOCAINE AND MENTHOL 5; 1 G/100ML; G/100ML
1 LIQUID ORAL THREE TIMES A DAY
Refills: 0 | Status: DISCONTINUED | OUTPATIENT
Start: 2020-11-06 | End: 2020-11-06

## 2020-11-06 RX ORDER — CHOLECALCIFEROL (VITAMIN D3) 125 MCG
1 CAPSULE ORAL
Qty: 0 | Refills: 0 | DISCHARGE

## 2020-11-06 RX ORDER — BENZOCAINE AND MENTHOL 5; 1 G/100ML; G/100ML
1 LIQUID ORAL
Qty: 28 | Refills: 0
Start: 2020-11-06 | End: 2020-11-12

## 2020-11-06 RX ADMIN — HEPARIN SODIUM 5000 UNIT(S): 5000 INJECTION INTRAVENOUS; SUBCUTANEOUS at 06:09

## 2020-11-06 RX ADMIN — MYCOPHENOLATE MOFETIL 1500 MILLIGRAM(S): 250 CAPSULE ORAL at 06:08

## 2020-11-06 RX ADMIN — LAMOTRIGINE 200 MILLIGRAM(S): 25 TABLET, ORALLY DISINTEGRATING ORAL at 06:07

## 2020-11-06 RX ADMIN — Medication 50 MILLIGRAM(S): at 06:08

## 2020-11-06 RX ADMIN — BOSENTAN 125 MILLIGRAM(S): 125 TABLET, FILM COATED ORAL at 06:10

## 2020-11-06 RX ADMIN — Medication 60 MILLIGRAM(S): at 06:09

## 2020-11-06 RX ADMIN — PANTOPRAZOLE SODIUM 40 MILLIGRAM(S): 20 TABLET, DELAYED RELEASE ORAL at 06:07

## 2020-11-06 NOTE — DIETITIAN INITIAL EVALUATION ADULT. - OTHER INFO
11/5  S/P Esophageal stenosis s/p EGD with dilation to 12 mm due to.   patient drinks ensure at home 1-2 times daily. patient encouraged to eat smaller, more frequent meals, small sips and bites throughput day. 11/5  S/P Esophageal stenosis s/p EGD with dilation   patient drinks ensure at home 1-2 times daily. patient encouraged to eat smaller, more frequent meals, small sips and bites throughput day.

## 2020-11-06 NOTE — PROGRESS NOTE ADULT - ASSESSMENT
64 year old female w hx of scleroderma esophagus enquiring multiple dilationt presenting with progressive dysphagia     Problem/Recommendation - 1:  Problem: Dysphagia. Recommendation: Now improved s/p dilation of the stricture. Advance to soft diet. If patient does well with this no objection to d/c.     Problem/Recommendation - 2:  ·  Problem: Peptic stricture of esophagus.  Recommendation: s/p EGD on 11/5, with balloon dilation to 12mm. No signs of complication at this time.  -I emphasized to the patient that she should maintain her upcoming appt with Dr. Lucio for further dilation  -PPI BID     Problem/Recommendation - 3:  ·  Problem: Unintentional weight loss.  Recommendation: Likely due to dysphagia. Now s/p dilation of the stricture.  -I suggest that she utilize nutritional supplements     Problem/Recommendation - 4:  ·  Problem: Scleroderma.  Recommendation: on cellcept. Rheumatologist is Dr. Goldberg.      Problem/Recommendation - 5:  ·  Problem: Esophagitis. Possibly due to reflux, rule out infection. Discussed with Dr. Lucio, he will consider biopsy on upcoming endoscopy.     Problem/Recommendation - 6:  Problem: GERD (gastroesophageal reflux disease). Recommendation: Pt takes Nexium as an outpateint.  She is very prone to GERD given lax LES. WIll need to continue this going forward.     Problem/Recommendation - 7:  Problem: Seizure disorder. Recommendation: per medicine.     Problem/Recommendation - 8:  Problem: Raynaud's disease without gangrene.    Attending Attestation:   Differential diagnosis and plan of care discussed with patient after the evaluation  75 Minutes spent on total encounter of which more than fifty percent of the encounter was spent counseling and/or coordinating care by the attending physician.        Orestes Harris M.D.

## 2020-11-06 NOTE — DISCHARGE NOTE NURSING/CASE MANAGEMENT/SOCIAL WORK - NSDCFUADDAPPT_GEN_ALL_CORE_FT
Follow up with Gastroenterology for repeat endoscopy with Dr. Lucio for further dilation in 2-4 weeks.  CALL ON MONDAY TO SCHEDULE APPOINTMENT IN 1 WEEK.

## 2020-11-06 NOTE — PROGRESS NOTE ADULT - SUBJECTIVE AND OBJECTIVE BOX
Patient is a 64y old  Female who presents with a chief complaint of Difficulty with swallowing with solids for the past 10 days (06 Nov 2020 10:10)                                                               INTERVAL HPI/OVERNIGHT EVENTS:    REVIEW OF SYSTEMS:     CONSTITUTIONAL: No weakness, fevers or chills  RESPIRATORY: No cough, wheezing,  No shortness of breath  CARDIOVASCULAR: No chest pain or palpitations  GASTROINTESTINAL: No abdominal pain  . No nausea, vomiting, or hematemesis; No diarrhea or constipation. No melena or hematochezia.  GENITOURINARY: No dysuria, frequency or hematuria  NEUROLOGICAL: No numbness or weakness  SKIN: No itching, burning, rashes, or lesions                                                                                                                                                                                                                                                                                 Medications:  MEDICATIONS  (STANDING):  bosentan 125 milliGRAM(s) Oral two times a day  dextrose 5%. 1000 milliLiter(s) (50 mL/Hr) IV Continuous <Continuous>  dextrose 50% Injectable 12.5 Gram(s) IV Push once  dextrose 50% Injectable 25 Gram(s) IV Push once  dextrose 50% Injectable 25 Gram(s) IV Push once  doxazosin 2 milliGRAM(s) Oral at bedtime  heparin   Injectable 5000 Unit(s) SubCutaneous every 12 hours  influenza   Vaccine 0.5 milliLiter(s) IntraMuscular once  lamoTRIgine 200 milliGRAM(s) Oral two times a day  metoprolol tartrate 50 milliGRAM(s) Oral two times a day  mycophenolate mofetil 1500 milliGRAM(s) Oral <User Schedule>  mycophenolate mofetil 1000 milliGRAM(s) Oral at bedtime  NIFEdipine XL 60 milliGRAM(s) Oral daily  pantoprazole  Injectable 40 milliGRAM(s) IV Push two times a day  sildenafil (REVATIO) 20 milliGRAM(s) Oral three times a day    MEDICATIONS  (PRN):  benzocaine 15 mG/menthol 3.6 mG (Sugar-Free) Lozenge 1 Lozenge Oral three times a day PRN Sore Throat  dextrose 40% Gel 15 Gram(s) Oral once PRN Blood Glucose LESS THAN 70 milliGRAM(s)/deciliter  glucagon  Injectable 1 milliGRAM(s) IntraMuscular once PRN Glucose LESS THAN 70 milligrams/deciliter       Allergies    No Known Allergies    Intolerances      Vital Signs Last 24 Hrs  T(C): 36.9 (06 Nov 2020 12:02), Max: 36.9 (06 Nov 2020 09:08)  T(F): 98.4 (06 Nov 2020 12:02), Max: 98.4 (06 Nov 2020 09:08)  HR: 72 (06 Nov 2020 12:02) (62 - 73)  BP: 106/58 (06 Nov 2020 12:02) (105/53 - 136/71)  BP(mean): --  RR: 18 (06 Nov 2020 12:02) (18 - 20)  SpO2: 98% (06 Nov 2020 12:02) (95% - 98%)  CAPILLARY BLOOD GLUCOSE      POCT Blood Glucose.: 120 mg/dL (06 Nov 2020 12:34)  POCT Blood Glucose.: 96 mg/dL (06 Nov 2020 08:42)  POCT Blood Glucose.: 82 mg/dL (05 Nov 2020 21:58)  POCT Blood Glucose.: 140 mg/dL (05 Nov 2020 17:04)      11-05 @ 07:01  -  11-06 @ 07:00  --------------------------------------------------------  IN: 240 mL / OUT: 0 mL / NET: 240 mL    11-06 @ 07:01  -  11-06 @ 14:16  --------------------------------------------------------  IN: 480 mL / OUT: 0 mL / NET: 480 mL      Physical Exam:    Daily     Daily   General:  Well appearing, NAD, not cachetic  HEENT:  Nonicteric, PERRLA  CV:  RRR, S1S2   Lungs:  CTA B/L, no wheezes, rales, rhonchi  Abdomen:  Soft, non-tender, no distended, positive BS  Extremities:  2+ pulses, no c/c, no edema  Skin:  Warm and dry, no rashes  :  No kline  Neuro:  AAOx3, non-focal, grossly intact                                                                                                                                                                                                                                                                                                LABS:                               10.2   3.95  )-----------( 165      ( 06 Nov 2020 06:33 )             33.0                      11-06    137  |  100  |  14  ----------------------------<  91  3.9   |  28  |  0.82    Ca    9.5      06 Nov 2020 06:33                         RADIOLOGY & ADDITIONAL TESTS         I personally reviewed: [  ]EKG   [  ]CXR    [  ] CT      A/P:         Discussed with :     [facility] consultants' Notes   Time spent :

## 2020-11-06 NOTE — DIETITIAN INITIAL EVALUATION ADULT. - ORAL INTAKE PTA/DIET HISTORY
Patient admitted with esophageal stricture. Patient has h/o scleroderma w/worsening inability to tolerate PO foods and vomiting often. History of weight loss, pt states she weighed 65kg 4 hrs ago,  when first diagnosed; 118 lbs  , 53.6kg 1 year ago. patient takes protein powder daily mixed with liquids equivalent to 40gm daily but cannot remember name of product, Pt tolerated yogurt, eats no meat, chicken in soup removed and she tolerates broth. Patient admitted with esophageal stricture. Patient has h/o scleroderma w/worsening ability to tolerate PO foods and vomiting often as stated. History of weight loss, pt states she weighed 65kg 4 hrs ago,  when first diagnosed; 118 lbs  , 53.6kg 1 year ago, and 114 at home last week. Patient takes protein powder daily mixed with liquids equivalent to 40gm daily but cannot remember name of product, Pt tolerated yogurt; eats no meat, no chicken except boiled in soup and then removed

## 2020-11-06 NOTE — PROGRESS NOTE ADULT - REASON FOR ADMISSION
Difficulty with swallowing with solids for the past 10 days

## 2020-11-06 NOTE — DISCHARGE NOTE NURSING/CASE MANAGEMENT/SOCIAL WORK - PATIENT PORTAL LINK FT
You can access the FollowMyHealth Patient Portal offered by [facility] by registering at the following website: [email or web address]. By joining [employer]’s FollowMyHealth portal, you will also be able to view your health information using other applications (apps) compatible with our system.

## 2020-11-06 NOTE — PROGRESS NOTE ADULT - ASSESSMENT
Problem/Plan - 1:  ·  Problem: Esophageal stricture.  Plan: See above.  discussed with Dr. Harris :  s/p EGD and dilatation   planned repeat dilatation in few weeks as opt  avoid NSAIDS for now   ppi     Problem/Plan - 2:  ·  Problem: Scleroderma.  Plan: cont meds     Problem/Plan - 3:  ·  Problem: Pulmonary hypertension.  Plan: cont op meds     Problem/Plan - 4:  ·  Problem: Essential hypertension.  Plan: On prazosin, metoprolol, nifedipine.        Problem/Plan - 5:  ·  Problem: Seizure disorder.  Plan: On Lamictal as above.     Problem/Plan - 6:  Problem: Thyroid nodule. Plan: By history, undifferentiated, US noted   TFT acceptable   endo fu     Problem/Plan - 7:  ·  Problem: Need for prophylactic measure.  Plan: Patient agrees to pharmacologic DVT prophylaxis.

## 2020-11-06 NOTE — PROGRESS NOTE ADULT - NUTRITIONAL ASSESSMENT
This patient has been assessed with a concern for Malnutrition and has been determined to have a diagnosis/diagnoses of Severe protein-calorie malnutrition.    This patient is being managed with:   Diet Pureed-  Low Sodium  Entered: Nov 6 2020  2:06PM    
This patient has been assessed with a concern for Malnutrition and has been determined to have a diagnosis/diagnoses of Severe protein-calorie malnutrition.    This patient is being managed with:   Diet Pureed-  Low Sodium  Entered: Nov 6 2020  2:06PM

## 2020-11-06 NOTE — CONSULT NOTE ADULT - REASON FOR ADMISSION
Difficulty with swallowing with solids for the past 10 days
Difficulty with swallowing with solids for the past 10 days

## 2020-11-06 NOTE — CONSULT NOTE ADULT - SUBJECTIVE AND OBJECTIVE BOX
HPI:  NIGHT HOSPITALIST:    Patient UNKNOWN to me previously, assigned to me at this point via the ER and by Dr. Riggs to admit this 65 y/o independent F--patient followed by her office physicians above--patient with a history of epilepsy, progressive systemic sclerosis with sclerodactyly, oesophageal, and pulmonary sequelae, with a history of pulmonary HTN on bosentan and sildenafil, reported GERD, reported undifferentiated thyroid nodules, last admission to [place] in August 2018, but had a balloon dilation this year in Feb 2020, with patient with unable to tolerate solids for the past 10 days and sensation of fullness of throat with swallowing.  Patient reports sustained catabolic state for the past 3 years. (04 Nov 2020 17:17)  Endocrine history obtained with help from son translating over speakerphone.  Patient has reported 10 year history of undifferentiated thyroid nodules, largely unchanged in size, admitted with painful swallowing and fullness in her neck. She has history of scleroderma, now with esophageal stricture (s/p dilation). Follows up with PCP for health management.    PAST MEDICAL & SURGICAL HISTORY:  Digital blood vessel injury    Dysphagia, unspecified type    Seizure    Raynaud&#x27;s disease without gangrene    Mitral regurgitation  mild    Heart murmur    GERD (gastroesophageal reflux disease)    Pulmonary hypertension    Arthritis    Arrhythmia    Scleroderma    No significant past surgical history        FAMILY HISTORY:  No pertinent family history in first degree relatives        Social History:    Outpatient Medications:    MEDICATIONS  (STANDING):  bosentan 125 milliGRAM(s) Oral two times a day  dextrose 5%. 1000 milliLiter(s) (50 mL/Hr) IV Continuous <Continuous>  dextrose 50% Injectable 12.5 Gram(s) IV Push once  dextrose 50% Injectable 25 Gram(s) IV Push once  dextrose 50% Injectable 25 Gram(s) IV Push once  doxazosin 2 milliGRAM(s) Oral at bedtime  heparin   Injectable 5000 Unit(s) SubCutaneous every 12 hours  influenza   Vaccine 0.5 milliLiter(s) IntraMuscular once  lamoTRIgine 200 milliGRAM(s) Oral two times a day  metoprolol tartrate 50 milliGRAM(s) Oral two times a day  mycophenolate mofetil 1500 milliGRAM(s) Oral <User Schedule>  mycophenolate mofetil 1000 milliGRAM(s) Oral at bedtime  NIFEdipine XL 60 milliGRAM(s) Oral daily  pantoprazole  Injectable 40 milliGRAM(s) IV Push two times a day  sildenafil (REVATIO) 20 milliGRAM(s) Oral three times a day    MEDICATIONS  (PRN):  benzocaine 15 mG/menthol 3.6 mG (Sugar-Free) Lozenge 1 Lozenge Oral three times a day PRN Sore Throat  dextrose 40% Gel 15 Gram(s) Oral once PRN Blood Glucose LESS THAN 70 milliGRAM(s)/deciliter  glucagon  Injectable 1 milliGRAM(s) IntraMuscular once PRN Glucose LESS THAN 70 milligrams/deciliter      Allergies    No Known Allergies    Intolerances      Review of Systems:  Constitutional: No fever, no chills  Eyes: No blurry vision  Neuro: No tremors  HEENT: Neck pain, painful swallowing  Cardiovascular: No chest pain, no palpitations  Respiratory: Has SOB, no cough  GI: No nausea, vomiting, abdominal pain  : No dysuria  Skin: no rash  MSK: Has leg swelling.  Psych: no depression  Endocrine: no polyuria, polydipsia    ALL OTHER SYSTEMS REVIEWED AND NEGATIVE    UNABLE TO OBTAIN    PHYSICAL EXAM:  VITALS: T(C): 36.9 (11-06-20 @ 12:02)  T(F): 98.4 (11-06-20 @ 12:02), Max: 98.4 (11-06-20 @ 09:08)  HR: 72 (11-06-20 @ 12:02) (62 - 73)  BP: 106/58 (11-06-20 @ 12:02) (105/53 - 136/71)  RR:  (18 - 20)  SpO2:  (95% - 98%)  Wt(kg): --  GENERAL: NAD, well-groomed, well-developed  EYES: No proptosis, no lid lag  HEENT:  Atraumatic, Normocephalic  THYROID: small nodules palpated  RESPIRATORY: Clear to auscultation bilaterally; No rales, rhonchi, wheezing  CARDIOVASCULAR: Si S2, No murmurs;  GI: Soft, non distended, normal bowel sounds  SKIN: Dry, intact, No rashes or lesions  MUSCULOSKELETAL: Has BL lower extremity edema.  NEURO:  no tremor, sensation decreased in feet BL,    POCT Blood Glucose.: 120 mg/dL (11-06-20 @ 12:34)  POCT Blood Glucose.: 96 mg/dL (11-06-20 @ 08:42)  POCT Blood Glucose.: 82 mg/dL (11-05-20 @ 21:58)  POCT Blood Glucose.: 140 mg/dL (11-05-20 @ 17:04)  POCT Blood Glucose.: 73 mg/dL (11-05-20 @ 08:18)  POCT Blood Glucose.: 80 mg/dL (11-05-20 @ 06:18)  POCT Blood Glucose.: 81 mg/dL (11-04-20 @ 19:47)                            10.2   3.95  )-----------( 165      ( 06 Nov 2020 06:33 )             33.0       11-06    137  |  100  |  14  ----------------------------<  91  3.9   |  28  |  0.82    EGFR if : 88  EGFR if non : 76    Ca    9.5      11-06    TPro  7.4  /  Alb  4.6  /  TBili  0.2  /  DBili  x   /  AST  13  /  ALT  6<L>  /  AlkPhos  62  11-04      Thyroid Function Tests:  11-05 @ 06:37 TSH 0.45 FreeT4 -- T3 121 Anti TPO -- Anti Thyroglobulin Ab -- TSI --              Radiology:                    HPI:  NIGHT HOSPITALIST:    Patient UNKNOWN to me previously, assigned to me at this point via the ER and by Dr. Riggs to admit this 63 y/o independent F--patient followed by her office physicians above--patient with a history of epilepsy, progressive systemic sclerosis with sclerodactyly, oesophageal, and pulmonary sequelae, with a history of pulmonary HTN on bosentan and sildenafil, reported GERD, reported undifferentiated thyroid nodules, last admission to [place] in August 2018, but had a balloon dilation this year in Feb 2020, with patient with unable to tolerate solids for the past 10 days and sensation of fullness of throat with swallowing.  Patient reports sustained catabolic state for the past 3 years. (04 Nov 2020 17:17)  Endocrine history obtained with help from son translating over speakerphone.  Patient has reported 10 year history of undifferentiated thyroid nodules, largely unchanged in size, admitted with painful swallowing and fullness in her neck. She has history of scleroderma, now with esophageal stricture (s/p dilation). Follows up with PCP for health management.    PAST MEDICAL & SURGICAL HISTORY:  Digital blood vessel injury    Dysphagia, unspecified type    Seizure    Raynaud&#x27;s disease without gangrene    Mitral regurgitation  mild    Heart murmur    GERD (gastroesophageal reflux disease)    Pulmonary hypertension    Arthritis    Arrhythmia    Scleroderma    No significant past surgical history        FAMILY HISTORY:  No pertinent family history in first degree relatives        Social History:    Outpatient Medications:    MEDICATIONS  (STANDING):  bosentan 125 milliGRAM(s) Oral two times a day  dextrose 5%.  1000 milliLiter(s) (50 mL/Hr) IV Continuous <Continuous>  dextrose 50% Injectable 12.5 Gram(s) IV Push once  dextrose 50% Injectable 25 Gram(s) IV Push once  dextrose 50% Injectable 25 Gram(s) IV Push once  doxazosin 2 milliGRAM(s) Oral at bedtime  heparin   Injectable 5000 Unit(s) SubCutaneous every 12 hours  influenza   Vaccine 0.5 milliLiter(s) IntraMuscular once  lamoTRIgine 200 milliGRAM(s) Oral two times a day  metoprolol tartrate 50 milliGRAM(s) Oral two times a day  mycophenolate mofetil 1500 milliGRAM(s) Oral <User Schedule>  mycophenolate mofetil 1000 milliGRAM(s) Oral at bedtime  NIFEdipine XL 60 milliGRAM(s) Oral daily  pantoprazole  Injectable 40 milliGRAM(s) IV Push two times a day  sildenafil (REVATIO) 20 milliGRAM(s) Oral three times a day    MEDICATIONS  (PRN):  benzocaine 15 mG/menthol 3.6 mG (Sugar-Free) Lozenge 1 Lozenge Oral three times a day PRN Sore Throat  dextrose 40% Gel 15 Gram(s) Oral once PRN Blood Glucose LESS THAN 70 milliGRAM(s)/deciliter  glucagon  Injectable 1 milliGRAM(s) IntraMuscular once PRN Glucose LESS THAN 70 milligrams/deciliter      Allergies    No Known Allergies    Intolerances      Review of Systems:  Constitutional: No fever, no chills  Eyes: No blurry vision  Neuro: No tremors  HEENT: Neck pain, painful swallowing  Cardiovascular: No chest pain, no palpitations  Respiratory: Has SOB, no cough  GI: No nausea, vomiting, abdominal pain  : No dysuria  Skin: no rash  MSK: Has leg swelling.  Psych: no depression  Endocrine: no polyuria, polydipsia    ALL OTHER SYSTEMS REVIEWED AND NEGATIVE    UNABLE TO OBTAIN    PHYSICAL EXAM:  VITALS: T(C): 36.9 (11-06-20 @ 12:02)  T(F): 98.4 (11-06-20 @ 12:02), Max: 98.4 (11-06-20 @ 09:08)  HR: 72 (11-06-20 @ 12:02) (62 - 73)  BP: 106/58 (11-06-20 @ 12:02) (105/53 - 136/71)  RR:  (18 - 20)  SpO2:  (95% - 98%)  Wt(kg): --  GENERAL: NAD, well-groomed, well-developed  EYES: No proptosis, no lid lag  HEENT:  Atraumatic, Normocephalic  THYROID: small nodules palpated  RESPIRATORY: Clear to auscultation bilaterally; No rales, rhonchi, wheezing  CARDIOVASCULAR: Si S2, No murmurs;  GI: Soft, non distended, normal bowel sounds  SKIN: Dry, intact, No rashes or lesions  MUSCULOSKELETAL: Has BL lower extremity edema.  NEURO:  no tremor, sensation decreased in feet BL,    POCT Blood Glucose.: 120 mg/dL (11-06-20 @ 12:34)  POCT Blood Glucose.: 96 mg/dL (11-06-20 @ 08:42)  POCT Blood Glucose.: 82 mg/dL (11-05-20 @ 21:58)  POCT Blood Glucose.: 140 mg/dL (11-05-20 @ 17:04)  POCT Blood Glucose.: 73 mg/dL (11-05-20 @ 08:18)  POCT Blood Glucose.: 80 mg/dL (11-05-20 @ 06:18)  POCT Blood Glucose.: 81 mg/dL (11-04-20 @ 19:47)                            10.2   3.95  )-----------( 165      ( 06 Nov 2020 06:33 )             33.0       11-06    137  |  100  |  14  ----------------------------<  91  3.9   |  28  |  0.82    EGFR if : 88  EGFR if non : 76    Ca    9.5      11-06    TPro  7.4  /  Alb  4.6  /  TBili  0.2  /  DBili  x   /  AST  13  /  ALT  6<L>  /  AlkPhos  62  11-04      Thyroid Function Tests:  11-05 @ 06:37 TSH 0.45 FreeT4 -- T3 121 Anti TPO -- Anti Thyroglobulin Ab -- TSI --              Radiology:

## 2020-11-06 NOTE — DIETITIAN INITIAL EVALUATION ADULT. - PERTINENT MEDS FT
MEDICATIONS  (STANDING):  bosentan 125 milliGRAM(s) Oral two times a day  dextrose 5%. 1000 milliLiter(s) (50 mL/Hr) IV Continuous <Continuous>  dextrose 50% Injectable 12.5 Gram(s) IV Push once  dextrose 50% Injectable 25 Gram(s) IV Push once  dextrose 50% Injectable 25 Gram(s) IV Push once  doxazosin 2 milliGRAM(s) Oral at bedtime  heparin   Injectable 5000 Unit(s) SubCutaneous every 12 hours  influenza   Vaccine 0.5 milliLiter(s) IntraMuscular once  lamoTRIgine 200 milliGRAM(s) Oral two times a day  metoprolol tartrate 50 milliGRAM(s) Oral two times a day  mycophenolate mofetil 1500 milliGRAM(s) Oral <User Schedule>  mycophenolate mofetil 1000 milliGRAM(s) Oral at bedtime  NIFEdipine XL 60 milliGRAM(s) Oral daily  pantoprazole  Injectable 40 milliGRAM(s) IV Push two times a day  sildenafil (REVATIO) 20 milliGRAM(s) Oral three times a day

## 2020-11-06 NOTE — PROGRESS NOTE ADULT - SUBJECTIVE AND OBJECTIVE BOX
Chief Complaint:  Patient is a 64y old  Female who presents with a chief complaint of Difficulty with swallowing with solids for the past 10 days (06 Nov 2020 14:34)      Interval Events: no GI bleeding, tolerating liquids well.    Allergies:  No Known Allergies      Hospital Medications:  benzocaine 15 mG/menthol 3.6 mG (Sugar-Free) Lozenge 1 Lozenge Oral three times a day PRN  bosentan 125 milliGRAM(s) Oral two times a day  dextrose 40% Gel 15 Gram(s) Oral once PRN  dextrose 5%. 1000 milliLiter(s) IV Continuous <Continuous>  dextrose 50% Injectable 12.5 Gram(s) IV Push once  dextrose 50% Injectable 25 Gram(s) IV Push once  dextrose 50% Injectable 25 Gram(s) IV Push once  doxazosin 2 milliGRAM(s) Oral at bedtime  glucagon  Injectable 1 milliGRAM(s) IntraMuscular once PRN  heparin   Injectable 5000 Unit(s) SubCutaneous every 12 hours  influenza   Vaccine 0.5 milliLiter(s) IntraMuscular once  lamoTRIgine 200 milliGRAM(s) Oral two times a day  metoprolol tartrate 50 milliGRAM(s) Oral two times a day  mycophenolate mofetil 1500 milliGRAM(s) Oral <User Schedule>  mycophenolate mofetil 1000 milliGRAM(s) Oral at bedtime  NIFEdipine XL 60 milliGRAM(s) Oral daily  pantoprazole  Injectable 40 milliGRAM(s) IV Push two times a day  sildenafil (REVATIO) 20 milliGRAM(s) Oral three times a day      PMHX/PSHX:  Digital blood vessel injury    Dysphagia, unspecified type    Seizure    Raynaud&#x27;s disease without gangrene    Mitral regurgitation    Heart murmur    GERD (gastroesophageal reflux disease)    Epilepsy    Pulmonary hypertension    Arthritis    Arrhythmia    Scleroderma    No significant past surgical history        Family history:  No pertinent family history in first degree relatives    No pertinent family history in first degree relatives        ROS:     General:  No wt loss, fevers, chills, night sweats, fatigue,   Eyes:  Good vision, no reported pain  ENT:  No sore throat, pain, runny nose, dysphagia  CV:  No pain, palpitations, hypo/hypertension  Resp:  No dyspnea, cough, tachypnea, wheezing  GI:  See HPI  :  No pain, bleeding, incontinence, nocturia  Muscle:  No pain, weakness  Neuro:  No weakness, tingling, memory problems  Psych:  No fatigue, insomnia, mood problems, depression  Endocrine:  No polyuria, polydipsia, cold/heat intolerance  Heme:  No petechiae, ecchymosis, easy bruisability  Skin:  No rash, edema      PHYSICAL EXAM:     GENERAL:  Appears stated age, well-groomed, very thin, no distress  HEENT:  NC/AT,  conjunctivae clear, sclera-anicteric  NECK: Trachea midline, supple  CHEST:  Full & symmetric excursion, no increased effort, breath sounds clear  HEART:  Regular rhythm, no jeyson/heave  ABDOMEN:  Soft, non-tender, non-distended, normoactive bowel sounds,  no masses ,no hepato-splenomegaly,   EXTREMITIES:  no cyanosis,clubbing or edema  SKIN:  No rash/erythema/petechiae, no jaundice, +sclerodacytly  NEURO:  Alert, oriented, no asterixis  RECTAL: Deferred    Vital Signs:  Vital Signs Last 24 Hrs  T(C): 36.9 (06 Nov 2020 12:02), Max: 36.9 (06 Nov 2020 09:08)  T(F): 98.4 (06 Nov 2020 12:02), Max: 98.4 (06 Nov 2020 09:08)  HR: 72 (06 Nov 2020 12:02) (64 - 73)  BP: 106/58 (06 Nov 2020 12:02) (105/53 - 136/71)  BP(mean): --  RR: 18 (06 Nov 2020 12:02) (18 - 18)  SpO2: 98% (06 Nov 2020 12:02) (95% - 98%)  Daily     Daily     LABS:                        10.2   3.95  )-----------( 165      ( 06 Nov 2020 06:33 )             33.0     11-06    137  |  100  |  14  ----------------------------<  91  3.9   |  28  |  0.82    Ca    9.5      06 Nov 2020 06:33                Imaging:

## 2020-11-06 NOTE — CONSULT NOTE ADULT - PROBLEM SELECTOR RECOMMENDATION 9
Dysphagia 2/2 esophageal stricture. Small thyroid nodules, largely unchanged in size and not contributing.  Suggest to FU outpatient endo 1 year for monitoring of thyroid nodules.  Discussed plan with patient and primary team.

## 2020-11-06 NOTE — DIETITIAN INITIAL EVALUATION ADULT. - REASON INDICATOR FOR ASSESSMENT
Nutrition Support. Nutrition Support.   Patient is a 64y old  Female who presents with a chief complaint of Difficulty with swallowing with solids for the past 10 days

## 2020-11-17 ENCOUNTER — LABORATORY RESULT (OUTPATIENT)
Age: 65
End: 2020-11-17

## 2020-11-17 ENCOUNTER — APPOINTMENT (OUTPATIENT)
Dept: DISASTER EMERGENCY | Facility: CLINIC | Age: 65
End: 2020-11-17

## 2020-11-19 ENCOUNTER — OUTPATIENT (OUTPATIENT)
Dept: OUTPATIENT SERVICES | Facility: HOSPITAL | Age: 65
LOS: 1 days | End: 2020-11-19
Payer: COMMERCIAL

## 2020-11-19 VITALS
DIASTOLIC BLOOD PRESSURE: 55 MMHG | RESPIRATION RATE: 19 BRPM | TEMPERATURE: 97 F | WEIGHT: 113.98 LBS | OXYGEN SATURATION: 96 % | SYSTOLIC BLOOD PRESSURE: 141 MMHG | HEIGHT: 62 IN | HEART RATE: 92 BPM

## 2020-11-19 VITALS
OXYGEN SATURATION: 99 % | SYSTOLIC BLOOD PRESSURE: 120 MMHG | HEART RATE: 70 BPM | RESPIRATION RATE: 18 BRPM | DIASTOLIC BLOOD PRESSURE: 62 MMHG

## 2020-11-19 DIAGNOSIS — K22.2 ESOPHAGEAL OBSTRUCTION: ICD-10-CM

## 2020-11-19 PROCEDURE — 43249 ESOPH EGD DILATION <30 MM: CPT

## 2020-11-19 PROCEDURE — C1726: CPT

## 2020-11-19 RX ORDER — FERROUS GLUCONATE 100 %
1 POWDER (GRAM) MISCELLANEOUS
Qty: 0 | Refills: 0 | DISCHARGE

## 2020-11-19 RX ORDER — SODIUM CHLORIDE 9 MG/ML
500 INJECTION INTRAMUSCULAR; INTRAVENOUS; SUBCUTANEOUS
Refills: 0 | Status: DISCONTINUED | OUTPATIENT
Start: 2020-11-19 | End: 2020-12-03

## 2020-11-19 RX ORDER — ESOMEPRAZOLE MAGNESIUM 40 MG/1
1 CAPSULE, DELAYED RELEASE ORAL
Qty: 0 | Refills: 0 | DISCHARGE

## 2020-11-19 RX ORDER — ASPIRIN/CALCIUM CARB/MAGNESIUM 324 MG
1 TABLET ORAL
Qty: 0 | Refills: 0 | DISCHARGE

## 2020-11-19 RX ADMIN — SODIUM CHLORIDE 30 MILLILITER(S): 9 INJECTION INTRAMUSCULAR; INTRAVENOUS; SUBCUTANEOUS at 09:51

## 2020-11-19 NOTE — ASU DISCHARGE PLAN (ADULT/PEDIATRIC) - NSDISCH_DIET_ENDO_ALL_CORE_FT
Start with clear liquids for lunch. If tolerated well, then may have full liquids for dinner. Advance to soft diet tomorrow. Must continue to eat slowly, cut and chew food thoroughly, and drink fluids with meals. Never eat any tough meats or uncooked vegetables.    Do not drink alcoholic beverages for the next 24 hours.

## 2020-11-19 NOTE — PRE PROCEDURE NOTE - PRE PROCEDURE EVALUATION
Attending Physician:       Remi Lucio                     Procedure: EGD with esophageal dilation    Indication for Procedure: Dysphagia due to esophageal stricture, scleroderma esophagus  ________________________________________________________  PAST MEDICAL & SURGICAL HISTORY:  Digital blood vessel injury    Dysphagia, unspecified type    Seizure    Raynaud&#x27;s disease without gangrene    Mitral regurgitation  mild    Heart murmur    GERD (gastroesophageal reflux disease)    Pulmonary hypertension    Arthritis    Arrhythmia    Scleroderma    No significant past surgical history      ALLERGIES:  No Known Allergies    HOME MEDICATIONS:  aspirin 81 mg oral delayed release tablet: 1 tab(s) orally 2 times a day  bosentan 125 mg oral tablet: 1 tab(s) orally 2 times a day  calcium (as calcium citrate) 250 mg oral tablet: 1 tab(s) orally 2 times a day  CellCept 500 mg oral tablet: 3 tab(s) orally once a day in the morning  CellCept 500 mg oral tablet: 2 tab(s) orally once a day (at bedtime)  esomeprazole 40 mg oral delayed release capsule: 1 cap(s) orally 2 times a day  ferrous gluconate 324 mg (38 mg elemental iron) oral tablet: 1 tab(s) orally 2 times a day  LaMICtal 200 mg oral tablet: 1 tab(s) orally 2 times a day  Metoprolol Tartrate 50 mg oral tablet: 1 tab(s) orally 2 times a day  naproxen 500 mg oral tablet: 1 tab(s) orally 2 times a day, As Needed for pain.  NIFEdipine 60 mg oral tablet, extended release: 1 tab(s) orally once a day  prazosin 1 mg oral capsule: 2 cap(s) orally once a day in the evening  sildenafil 20 mg oral tablet: 1 tab(s) orally 3 times a day    AICD/PPM: [ ] yes   [X ] no    [facility]: [facility]                      PHYSICAL EXAMINATION:    T(C): -- 98.1  HR: --  87  BP: -- 115/60  RR: --  SpO2: -- 98%    Constitutional: NAD, taut skin  HEENT: PERRLA, EOMI,    Neck:  No JVD  Respiratory: CTAB/L  Cardiovascular: S1 and S2, 2/6 sys murmur  Gastrointestinal: BS+, soft, NT/ND  Extremities: Sclerodactyly. No peripheral edema  Neurological: A/O x 3, no focal deficits  Psychiatric: Normal mood, normal affect  Skin: No rashes    ASA Class: I [ ]  II [ ]  III [X ]  IV [ ]    COMMENTS:    The patient is a suitable candidate for the planned procedure unless box checked [ ]  No, explain:

## 2022-03-09 NOTE — ED PROVIDER NOTE - CROS ED GI ALL NEG
- - - [No Acute Distress] : no acute distress [Well Nourished] : well nourished [Well Developed] : well developed [No Respiratory Distress] : no respiratory distress  [No Accessory Muscle Use] : no accessory muscle use [Clear to Auscultation] : lungs were clear to auscultation bilaterally [Normal] : no carotid or abdominal bruits heard, no varicosities, pedal pulses are present, no peripheral edema, no extremity clubbing or cyanosis and no palpable aorta [Soft] : abdomen soft [Non Tender] : non-tender [Non-distended] : non-distended [Normal Posterior Cervical Nodes] : no posterior cervical lymphadenopathy [Normal Anterior Cervical Nodes] : no anterior cervical lymphadenopathy [No Rash] : no rash [Coordination Grossly Intact] : coordination grossly intact [Normal Gait] : normal gait [Normal Affect] : the affect was normal [Normal Insight/Judgement] : insight and judgment were intact

## 2022-04-19 NOTE — PRE-ANESTHESIA EVALUATION ADULT - NSDENTALSD_ENT_ALL_CORE
CC: Monoclonal Antibody Infusion/COVID 19 Positive  49yFemale    exam/findings:  T(C): 37.3 (04-19-22 @ 16:29), Max: 37.3 (04-19-22 @ 16:29)  HR: 67 (04-19-22 @ 16:29) (67 - 77)  BP: 125/74 (04-19-22 @ 16:29) (107/71 - 125/74)  RR: 16 (04-19-22 @ 16:29) (16 - 16)  SpO2: 100% (04-19-22 @ 16:29) (100% - 100%)      PE:   Appearance: NAD	  HEENT:   Normal oral mucosa,   Lymphatic: No lymphadenopathy  Cardiovascular: Normal S1 S2, No JVD, No murmurs, No edema  Respiratory: Lungs clear to auscultation	  Gastrointestinal:  Soft, Non-tender, + BS	  Skin: warm and dry  Neurologic: Non-focal  Extremities: Normal range of motion,                
appears normal and intact

## 2023-04-13 NOTE — PRE-ANESTHESIA EVALUATION ADULT - SPO2 (%)
Per Omaira, ok to Rx any of the alternatives. rx sent for semglee long acting pens     
Toujeo Solostar 300unit/ML Pen INJ PA required/Can we give alt RX?  See below for alternatives.        Toujeo SoloStar 300UNIT/ML pen-injectors Required    Basaglar KwikPen Not Required  HumaLOG KwikPen Not Required  Levemir Not Required  Semglee (yfgn) Not Required  Tresiba FlexTouch Not Required      
99

## 2023-04-28 NOTE — H&P PST ADULT - PSYCHIATRIC
Refused Prescriptions:                       Disp   Refills    tamsulosin (FLOMAX) 0.4 MG capsule [Pharma*90 cap*3        Sig: Take 1 capsule (0.4 mg) by mouth daily  Refused By: OG KELLER  Reason for Refusal: Patient should contact provider first       negative Affect and characteristics of appearance, verbalizations, behaviors are appropriate

## 2023-07-06 NOTE — ED ADULT TRIAGE NOTE - PAIN: PRESENCE, MLM
Diagnosis, Referred by & from: External Hemorrhoids   Appt date: 9/15/2023   NOTES STATUS DETAILS   OFFICE NOTE from referring provider [facility] [place]:  6/30/23 - [facility] OV with Dr. Aly   OFFICE NOTE from other specialist [facility] [place]:  10/25/22 - [facility] OV with Dr. Kulkarni   DISCHARGE SUMMARY from hospital N/A    DISCHARGE REPORT from the ER Received [facility]:  9/27/16 - ED OV with Dr. Fuentes   OPERATIVE REPORT Received [facility]:  11/11/19 - OP Note for EXCISION OF LEFT UPPER BACK MASS with Dr. Flores   MEDICATION LIST Internal    LABS     BIOPSIES/PATHOLOGY RELATED TO DIAGNOSIS Received [facility]:  11/11/19 - Back Mass Biopsy (Case: [id number]-)   DIAGNOSTIC PROCEDURES N/A    IMAGING (DISC & REPORT) N/A       Records Requested  07/06/23    Facility  [facility]  Fax: [phone]  [facility] Fax: [phone]   Outcome * 7/6/23 9:28 AM Faxed req to [facility] for records to be faxed to the clinic. - [place]    * 7/27/23 8 AM Faxed req to [facility] for records to be faxed to the clinic. - [place]    * 7/31/23 12:48 PM Records received from [facility] and sent to [facility] to be scanned into the chart. - [place]     
denies pain/discomfort

## 2024-08-04 NOTE — CONSULT NOTE ADULT - ASSESSMENT
Assessment  Multinodular goiter: 64y Female with reported 10 year history of undifferentiated thyroid nodules, admitted with painful swallowing and fullness in her neck. She has history of scleroderma, now with esophageal stricture (s/p egd and dilation). Euthyroid, thyroid US completed (compared with US in 2010), nodules largely unchanged in size. Patient seen eating lunch appears alert and comfortable, in no acute distress.  Dysphagia: s/p egd and dilation for esophageal stricture, stable, in no acute distress.      Elisabet Horn MD  Cell: [phone]  Office: [phone]     PROVIDER:[TOKEN:[[id number]],FOLLOWUP:[1 week],ESTABLISHEDPATIENT:[T]]   Assessment  Multinodular goiter: 64y Female with reported 10 year history of undifferentiated thyroid nodules, admitted with painful swallowing and fullness in her neck. She has history of scleroderma, now with esophageal stricture (s/p egd and dilation).  Euthyroid, thyroid US completed (compared with US in 2010), nodules largely unchanged in size. Patient seen eating lunch appears alert and comfortable, in no acute distress.  Dysphagia: s/p egd and dilation for esophageal stricture, stable, in no acute distress.      Elisabet Horn MD  Cell: [phone]  Office: [phone]

## 2024-09-16 ENCOUNTER — OUTPATIENT (OUTPATIENT)
Dept: OUTPATIENT SERVICES | Facility: HOSPITAL | Age: 69
LOS: 1 days | End: 2024-09-16
Payer: MEDICARE

## 2024-09-16 DIAGNOSIS — R06.02 SHORTNESS OF BREATH: ICD-10-CM

## 2024-09-16 PROCEDURE — 94453 HAST W/SUPPL OXYGEN TITRJ: CPT | Mod: 26

## 2024-09-16 PROCEDURE — 94452 HAST W/I&R PHYS/QHP: CPT

## 2025-03-25 NOTE — CHART NOTE - NSCHARTNOTEFT_GEN_A_CORE
Results of esophogram d/w Dr. Lucio earlier.  Patient started on clear liquid diet, will advance to mechanical soft if tolerates and possible discharge later (on mechanical soft) for 2 week follow up with GI.    Claudia Allen NP  [phone]
anxiety and depression